# Patient Record
Sex: FEMALE | Race: WHITE | NOT HISPANIC OR LATINO | URBAN - METROPOLITAN AREA
[De-identification: names, ages, dates, MRNs, and addresses within clinical notes are randomized per-mention and may not be internally consistent; named-entity substitution may affect disease eponyms.]

---

## 2018-08-04 ENCOUNTER — EMERGENCY (EMERGENCY)
Facility: HOSPITAL | Age: 28
LOS: 0 days | Discharge: HOME | End: 2018-08-04
Attending: EMERGENCY MEDICINE | Admitting: EMERGENCY MEDICINE

## 2018-08-04 VITALS
HEART RATE: 87 BPM | TEMPERATURE: 98 F | RESPIRATION RATE: 18 BRPM | OXYGEN SATURATION: 97 % | DIASTOLIC BLOOD PRESSURE: 61 MMHG | SYSTOLIC BLOOD PRESSURE: 105 MMHG

## 2018-08-04 DIAGNOSIS — Z88.1 ALLERGY STATUS TO OTHER ANTIBIOTIC AGENTS STATUS: ICD-10-CM

## 2018-08-04 DIAGNOSIS — Z79.899 OTHER LONG TERM (CURRENT) DRUG THERAPY: ICD-10-CM

## 2018-08-04 DIAGNOSIS — L50.0 ALLERGIC URTICARIA: ICD-10-CM

## 2018-08-04 DIAGNOSIS — L50.9 URTICARIA, UNSPECIFIED: ICD-10-CM

## 2018-08-04 DIAGNOSIS — F32.9 MAJOR DEPRESSIVE DISORDER, SINGLE EPISODE, UNSPECIFIED: ICD-10-CM

## 2018-08-04 RX ORDER — DIPHENHYDRAMINE HCL 50 MG
50 CAPSULE ORAL ONCE
Qty: 0 | Refills: 0 | Status: COMPLETED | OUTPATIENT
Start: 2018-08-04 | End: 2018-08-04

## 2018-08-04 RX ORDER — FAMOTIDINE 10 MG/ML
20 INJECTION INTRAVENOUS DAILY
Qty: 0 | Refills: 0 | Status: DISCONTINUED | OUTPATIENT
Start: 2018-08-04 | End: 2018-08-04

## 2018-08-04 RX ADMIN — FAMOTIDINE 20 MILLIGRAM(S): 10 INJECTION INTRAVENOUS at 14:33

## 2018-08-04 RX ADMIN — Medication 50 MILLIGRAM(S): at 14:33

## 2018-08-04 RX ADMIN — Medication 60 MILLIGRAM(S): at 14:33

## 2018-08-04 NOTE — ED ADULT TRIAGE NOTE - CHIEF COMPLAINT QUOTE
pt has hives to her right flank, pt has swelling to her right hand, and is unable to move fingers on her right hand. pt had some diff breathing yesterday but has gotten better.

## 2018-08-04 NOTE — ED ADULT NURSE NOTE - OBJECTIVE STATEMENT
Pt complains of hives, right hand swelling and swollen lip. Pt remains free from respiratory distress.

## 2018-08-04 NOTE — ED ADULT NURSE NOTE - NSIMPLEMENTINTERV_GEN_ALL_ED
Implemented All Universal Safety Interventions:  [place] to call system. Call bell, personal items and telephone within reach. Instruct patient to call for assistance. Room bathroom lighting operational. Non-slip footwear when patient is off stretcher. Physically safe environment: no spills, clutter or unnecessary equipment. Stretcher in lowest position, wheels locked, appropriate side rails in place.

## 2018-08-04 NOTE — ED PROVIDER NOTE - ATTENDING CONTRIBUTION TO CARE
Seen with PA agree with above, scattered hives, no swelling to oral mucosa, lungs- clear. Patient better after Benadryl and Prednisone, will discharge to fallow up with PMD on prednisone 40mg daily times 5- days

## 2018-08-04 NOTE — ED PROVIDER NOTE - PROGRESS NOTE DETAILS
Well appearing, non-toxic appearance with no evidence to suggest a more serious acute issue other than findings.  no indication for epi based on exam . The treatment plan and instructions for after care were reviewed prior to office discharge, pt verbalized understanding of plan of care, proper use of prescription /OTC meds (prednisone, pepcid and benadryl) and reasons to return to office or seek additional medical evaluation for ongoing or worsening complaints  side effects of steroids discussed. risk for GI upset. PUD, gerd, gastrities, bleeding explained.  take steroid w food, may use otc prilosec if GI upset.  d/c use if intolerable s/e , ie pain, psychosis  pt improved from meds in ER Pt aware that we will have official radiology read pending, and may result in change of xray read.  Pt voices understanding of use of medications, instructions for care, and reasons to return or to go to ED

## 2022-01-01 ENCOUNTER — INPATIENT (INPATIENT)
Facility: HOSPITAL | Age: 32
LOS: 3 days | End: 2022-05-19
Attending: INTERNAL MEDICINE
Payer: SUBSIDIZED

## 2022-01-01 VITALS
RESPIRATION RATE: 20 BRPM | HEART RATE: 102 BPM | SYSTOLIC BLOOD PRESSURE: 99 MMHG | DIASTOLIC BLOOD PRESSURE: 55 MMHG | OXYGEN SATURATION: 99 % | TEMPERATURE: 96 F

## 2022-01-01 LAB
ALBUMIN SERPL ELPH-MCNC: 2.7 G/DL — LOW (ref 3.5–5.2)
ALBUMIN SERPL ELPH-MCNC: 3 G/DL — LOW (ref 3.5–5.2)
ALBUMIN SERPL ELPH-MCNC: 3.4 G/DL — LOW (ref 3.5–5.2)
ALBUMIN SERPL ELPH-MCNC: 3.6 G/DL — SIGNIFICANT CHANGE UP (ref 3.5–5.2)
ALP SERPL-CCNC: 104 U/L — SIGNIFICANT CHANGE UP (ref 30–115)
ALP SERPL-CCNC: 65 U/L — SIGNIFICANT CHANGE UP (ref 30–115)
ALP SERPL-CCNC: 71 U/L — SIGNIFICANT CHANGE UP (ref 30–115)
ALP SERPL-CCNC: 81 U/L — SIGNIFICANT CHANGE UP (ref 30–115)
ALT FLD-CCNC: 104 U/L — HIGH (ref 0–41)
ALT FLD-CCNC: 47 U/L — HIGH (ref 0–41)
ALT FLD-CCNC: 62 U/L — HIGH (ref 0–41)
ALT FLD-CCNC: 79 U/L — HIGH (ref 0–41)
AMPHET UR-MCNC: NEGATIVE — SIGNIFICANT CHANGE UP
ANION GAP SERPL CALC-SCNC: 10 MMOL/L — SIGNIFICANT CHANGE UP (ref 7–14)
ANION GAP SERPL CALC-SCNC: 11 MMOL/L — SIGNIFICANT CHANGE UP (ref 7–14)
ANION GAP SERPL CALC-SCNC: 12 MMOL/L — SIGNIFICANT CHANGE UP (ref 7–14)
ANION GAP SERPL CALC-SCNC: 13 MMOL/L — SIGNIFICANT CHANGE UP (ref 7–14)
ANION GAP SERPL CALC-SCNC: 14 MMOL/L — SIGNIFICANT CHANGE UP (ref 7–14)
ANION GAP SERPL CALC-SCNC: 24 MMOL/L — HIGH (ref 7–14)
ANION GAP SERPL CALC-SCNC: 8 MMOL/L — SIGNIFICANT CHANGE UP (ref 7–14)
ANION GAP SERPL CALC-SCNC: 9 MMOL/L — SIGNIFICANT CHANGE UP (ref 7–14)
ANISOCYTOSIS BLD QL: SLIGHT — SIGNIFICANT CHANGE UP
ANISOCYTOSIS BLD QL: SLIGHT — SIGNIFICANT CHANGE UP
APPEARANCE UR: ABNORMAL
APPEARANCE UR: CLEAR — SIGNIFICANT CHANGE UP
APPEARANCE UR: CLEAR — SIGNIFICANT CHANGE UP
APTT BLD: 30.8 SEC — SIGNIFICANT CHANGE UP (ref 27–39.2)
AST SERPL-CCNC: 115 U/L — HIGH (ref 0–41)
AST SERPL-CCNC: 34 U/L — SIGNIFICANT CHANGE UP (ref 0–41)
AST SERPL-CCNC: 61 U/L — HIGH (ref 0–41)
AST SERPL-CCNC: 76 U/L — HIGH (ref 0–41)
BACTERIA # UR AUTO: NEGATIVE — SIGNIFICANT CHANGE UP
BARBITURATES UR SCN-MCNC: NEGATIVE — SIGNIFICANT CHANGE UP
BASE EXCESS BLDA CALC-SCNC: -2.2 MMOL/L — LOW (ref -2–3)
BASE EXCESS BLDA CALC-SCNC: -3.8 MMOL/L — LOW (ref -2–3)
BASE EXCESS BLDA CALC-SCNC: -8.8 MMOL/L — LOW (ref -2–3)
BASE EXCESS BLDV CALC-SCNC: -17.9 MMOL/L — LOW (ref -2–3)
BASOPHILS # BLD AUTO: 0 K/UL — SIGNIFICANT CHANGE UP (ref 0–0.2)
BASOPHILS # BLD AUTO: 0 K/UL — SIGNIFICANT CHANGE UP (ref 0–0.2)
BASOPHILS # BLD AUTO: 0.06 K/UL — SIGNIFICANT CHANGE UP (ref 0–0.2)
BASOPHILS # BLD AUTO: 0.06 K/UL — SIGNIFICANT CHANGE UP (ref 0–0.2)
BASOPHILS NFR BLD AUTO: 0 % — SIGNIFICANT CHANGE UP (ref 0–1)
BASOPHILS NFR BLD AUTO: 0 % — SIGNIFICANT CHANGE UP (ref 0–1)
BASOPHILS NFR BLD AUTO: 0.2 % — SIGNIFICANT CHANGE UP (ref 0–1)
BASOPHILS NFR BLD AUTO: 0.3 % — SIGNIFICANT CHANGE UP (ref 0–1)
BENZODIAZ UR-MCNC: POSITIVE
BENZOYLEGONINE, UR RESULT: 4666 NG/ML — SIGNIFICANT CHANGE UP
BILIRUB SERPL-MCNC: 0.2 MG/DL — SIGNIFICANT CHANGE UP (ref 0.2–1.2)
BILIRUB SERPL-MCNC: 0.2 MG/DL — SIGNIFICANT CHANGE UP (ref 0.2–1.2)
BILIRUB SERPL-MCNC: 0.3 MG/DL — SIGNIFICANT CHANGE UP (ref 0.2–1.2)
BILIRUB SERPL-MCNC: 0.5 MG/DL — SIGNIFICANT CHANGE UP (ref 0.2–1.2)
BILIRUB UR-MCNC: NEGATIVE — SIGNIFICANT CHANGE UP
BLD GP AB SCN SERPL QL: SIGNIFICANT CHANGE UP
BUN SERPL-MCNC: 10 MG/DL — SIGNIFICANT CHANGE UP (ref 10–20)
BUN SERPL-MCNC: 11 MG/DL — SIGNIFICANT CHANGE UP (ref 10–20)
BUN SERPL-MCNC: 12 MG/DL — SIGNIFICANT CHANGE UP (ref 10–20)
BUN SERPL-MCNC: 13 MG/DL — SIGNIFICANT CHANGE UP (ref 10–20)
BUN SERPL-MCNC: 14 MG/DL — SIGNIFICANT CHANGE UP (ref 10–20)
BUN SERPL-MCNC: 15 MG/DL — SIGNIFICANT CHANGE UP (ref 10–20)
BUN SERPL-MCNC: 17 MG/DL — SIGNIFICANT CHANGE UP (ref 10–20)
BUN SERPL-MCNC: 18 MG/DL — SIGNIFICANT CHANGE UP (ref 10–20)
BUN SERPL-MCNC: 19 MG/DL — SIGNIFICANT CHANGE UP (ref 10–20)
BUN SERPL-MCNC: 19 MG/DL — SIGNIFICANT CHANGE UP (ref 10–20)
BUN SERPL-MCNC: 21 MG/DL — HIGH (ref 10–20)
BUN SERPL-MCNC: 9 MG/DL — LOW (ref 10–20)
BZE UR QL SCN: 4666 NG/ML — SIGNIFICANT CHANGE UP
CA-I SERPL-SCNC: 1.51 MMOL/L — HIGH (ref 1.15–1.33)
CALCIUM SERPL-MCNC: 10 MG/DL — SIGNIFICANT CHANGE UP (ref 8.5–10.1)
CALCIUM SERPL-MCNC: 10.2 MG/DL — HIGH (ref 8.5–10.1)
CALCIUM SERPL-MCNC: 10.3 MG/DL — HIGH (ref 8.5–10.1)
CALCIUM SERPL-MCNC: 10.5 MG/DL — HIGH (ref 8.5–10.1)
CALCIUM SERPL-MCNC: 10.8 MG/DL — HIGH (ref 8.5–10.1)
CALCIUM SERPL-MCNC: 7.4 MG/DL — LOW (ref 8.5–10.1)
CALCIUM SERPL-MCNC: 7.8 MG/DL — LOW (ref 8.5–10.1)
CALCIUM SERPL-MCNC: 7.8 MG/DL — LOW (ref 8.5–10.1)
CALCIUM SERPL-MCNC: 7.9 MG/DL — LOW (ref 8.5–10.1)
CALCIUM SERPL-MCNC: 8.2 MG/DL — LOW (ref 8.5–10.1)
CALCIUM SERPL-MCNC: 8.4 MG/DL — LOW (ref 8.5–10.1)
CALCIUM SERPL-MCNC: 8.4 MG/DL — LOW (ref 8.5–10.1)
CALCIUM SERPL-MCNC: 8.5 MG/DL — SIGNIFICANT CHANGE UP (ref 8.5–10.1)
CALCIUM SERPL-MCNC: 9 MG/DL — SIGNIFICANT CHANGE UP (ref 8.5–10.1)
CALCIUM SERPL-MCNC: 9.3 MG/DL — SIGNIFICANT CHANGE UP (ref 8.5–10.1)
CALCIUM SERPL-MCNC: 9.6 MG/DL — SIGNIFICANT CHANGE UP (ref 8.5–10.1)
CALCIUM SERPL-MCNC: 9.6 MG/DL — SIGNIFICANT CHANGE UP (ref 8.5–10.1)
CALCIUM SERPL-MCNC: 9.8 MG/DL — SIGNIFICANT CHANGE UP (ref 8.5–10.1)
CALCIUM UR-MCNC: 41 MG/DL — SIGNIFICANT CHANGE UP
CHLORIDE SERPL-SCNC: 100 MMOL/L — SIGNIFICANT CHANGE UP (ref 98–110)
CHLORIDE SERPL-SCNC: 119 MMOL/L — HIGH (ref 98–110)
CHLORIDE SERPL-SCNC: 120 MMOL/L — HIGH (ref 98–110)
CHLORIDE SERPL-SCNC: 121 MMOL/L — HIGH (ref 98–110)
CHLORIDE SERPL-SCNC: 128 MMOL/L — HIGH (ref 98–110)
CHLORIDE SERPL-SCNC: 129 MMOL/L — HIGH (ref 98–110)
CHLORIDE SERPL-SCNC: 130 MMOL/L — HIGH (ref 98–110)
CHLORIDE SERPL-SCNC: 131 MMOL/L — HIGH (ref 98–110)
CHLORIDE SERPL-SCNC: 135 MMOL/L — HIGH (ref 98–110)
CHLORIDE SERPL-SCNC: 139 MMOL/L — HIGH (ref 98–110)
CHLORIDE SERPL-SCNC: 140 MMOL/L — HIGH (ref 98–110)
CHLORIDE SERPL-SCNC: 141 MMOL/L — HIGH (ref 98–110)
CHLORIDE SERPL-SCNC: 145 MMOL/L — HIGH (ref 98–110)
CHLORIDE SERPL-SCNC: 147 MMOL/L — HIGH (ref 98–110)
CHLORIDE SERPL-SCNC: 147 MMOL/L — HIGH (ref 98–110)
CHLORIDE SERPL-SCNC: 149 MMOL/L — HIGH (ref 98–110)
CK MB CFR SERPL CALC: 8.9 NG/ML — HIGH (ref 0.6–6.3)
CO2 SERPL-SCNC: 12 MMOL/L — LOW (ref 17–32)
CO2 SERPL-SCNC: 15 MMOL/L — LOW (ref 17–32)
CO2 SERPL-SCNC: 15 MMOL/L — LOW (ref 17–32)
CO2 SERPL-SCNC: 16 MMOL/L — LOW (ref 17–32)
CO2 SERPL-SCNC: 16 MMOL/L — LOW (ref 17–32)
CO2 SERPL-SCNC: 17 MMOL/L — SIGNIFICANT CHANGE UP (ref 17–32)
CO2 SERPL-SCNC: 18 MMOL/L — SIGNIFICANT CHANGE UP (ref 17–32)
CO2 SERPL-SCNC: 19 MMOL/L — SIGNIFICANT CHANGE UP (ref 17–32)
CO2 SERPL-SCNC: 20 MMOL/L — SIGNIFICANT CHANGE UP (ref 17–32)
COCAINE IN-HOUSE INTERPRETATION: POSITIVE
COCAINE METAB.OTHER UR-MCNC: POSITIVE
COCAINE UR QL SCN: POSITIVE
COLOR SPEC: COLORLESS — SIGNIFICANT CHANGE UP
COLOR SPEC: COLORLESS — SIGNIFICANT CHANGE UP
COLOR SPEC: YELLOW — SIGNIFICANT CHANGE UP
CREAT ?TM UR-MCNC: 118 MG/DL — SIGNIFICANT CHANGE UP
CREAT SERPL-MCNC: 0.6 MG/DL — LOW (ref 0.7–1.5)
CREAT SERPL-MCNC: 0.7 MG/DL — SIGNIFICANT CHANGE UP (ref 0.7–1.5)
CREAT SERPL-MCNC: 0.8 MG/DL — SIGNIFICANT CHANGE UP (ref 0.7–1.5)
CREAT SERPL-MCNC: 0.9 MG/DL — SIGNIFICANT CHANGE UP (ref 0.7–1.5)
CREAT SERPL-MCNC: 1.1 MG/DL — SIGNIFICANT CHANGE UP (ref 0.7–1.5)
CREAT SERPL-MCNC: 1.1 MG/DL — SIGNIFICANT CHANGE UP (ref 0.7–1.5)
CREAT SERPL-MCNC: 1.2 MG/DL — SIGNIFICANT CHANGE UP (ref 0.7–1.5)
CREAT SERPL-MCNC: 1.2 MG/DL — SIGNIFICANT CHANGE UP (ref 0.7–1.5)
CREAT SERPL-MCNC: 1.3 MG/DL — SIGNIFICANT CHANGE UP (ref 0.7–1.5)
D DIMER BLD IA.RAPID-MCNC: 3277 NG/ML DDU — HIGH (ref 0–230)
DIFF PNL FLD: ABNORMAL
EGFR: 100 ML/MIN/1.73M2 — SIGNIFICANT CHANGE UP
EGFR: 117 ML/MIN/1.73M2 — SIGNIFICANT CHANGE UP
EGFR: 121 ML/MIN/1.73M2 — SIGNIFICANT CHANGE UP
EGFR: 56 ML/MIN/1.73M2 — LOW
EGFR: 61 ML/MIN/1.73M2 — SIGNIFICANT CHANGE UP
EGFR: 61 ML/MIN/1.73M2 — SIGNIFICANT CHANGE UP
EGFR: 68 ML/MIN/1.73M2 — SIGNIFICANT CHANGE UP
EGFR: 68 ML/MIN/1.73M2 — SIGNIFICANT CHANGE UP
EGFR: 87 ML/MIN/1.73M2 — SIGNIFICANT CHANGE UP
EOSINOPHIL # BLD AUTO: 0.03 K/UL — SIGNIFICANT CHANGE UP (ref 0–0.7)
EOSINOPHIL # BLD AUTO: 0.11 K/UL — SIGNIFICANT CHANGE UP (ref 0–0.7)
EOSINOPHIL # BLD AUTO: 0.32 K/UL — SIGNIFICANT CHANGE UP (ref 0–0.7)
EOSINOPHIL # BLD AUTO: 0.55 K/UL — SIGNIFICANT CHANGE UP (ref 0–0.7)
EOSINOPHIL NFR BLD AUTO: 0.1 % — SIGNIFICANT CHANGE UP (ref 0–8)
EOSINOPHIL NFR BLD AUTO: 0.8 % — SIGNIFICANT CHANGE UP (ref 0–8)
EOSINOPHIL NFR BLD AUTO: 1.7 % — SIGNIFICANT CHANGE UP (ref 0–8)
EOSINOPHIL NFR BLD AUTO: 3.5 % — SIGNIFICANT CHANGE UP (ref 0–8)
EPI CELLS # UR: 0 /HPF — SIGNIFICANT CHANGE UP (ref 0–5)
EPI CELLS # UR: 16 /HPF — HIGH (ref 0–5)
EPI CELLS # UR: 2 /HPF — SIGNIFICANT CHANGE UP (ref 0–5)
ETHANOL SERPL-MCNC: <10 MG/DL — SIGNIFICANT CHANGE UP
GAS PNL BLDA: SIGNIFICANT CHANGE UP
GAS PNL BLDA: SIGNIFICANT CHANGE UP
GAS PNL BLDV: 131 MMOL/L — LOW (ref 136–145)
GAS PNL BLDV: SIGNIFICANT CHANGE UP
GIANT PLATELETS BLD QL SMEAR: PRESENT — SIGNIFICANT CHANGE UP
GIANT PLATELETS BLD QL SMEAR: PRESENT — SIGNIFICANT CHANGE UP
GLUCOSE SERPL-MCNC: 100 MG/DL — HIGH (ref 70–99)
GLUCOSE SERPL-MCNC: 106 MG/DL — HIGH (ref 70–99)
GLUCOSE SERPL-MCNC: 106 MG/DL — HIGH (ref 70–99)
GLUCOSE SERPL-MCNC: 121 MG/DL — HIGH (ref 70–99)
GLUCOSE SERPL-MCNC: 177 MG/DL — HIGH (ref 70–99)
GLUCOSE SERPL-MCNC: 184 MG/DL — HIGH (ref 70–99)
GLUCOSE SERPL-MCNC: 190 MG/DL — HIGH (ref 70–99)
GLUCOSE SERPL-MCNC: 190 MG/DL — HIGH (ref 70–99)
GLUCOSE SERPL-MCNC: 199 MG/DL — HIGH (ref 70–99)
GLUCOSE SERPL-MCNC: 226 MG/DL — HIGH (ref 70–99)
GLUCOSE SERPL-MCNC: 227 MG/DL — HIGH (ref 70–99)
GLUCOSE SERPL-MCNC: 227 MG/DL — HIGH (ref 70–99)
GLUCOSE SERPL-MCNC: 232 MG/DL — HIGH (ref 70–99)
GLUCOSE SERPL-MCNC: 239 MG/DL — HIGH (ref 70–99)
GLUCOSE SERPL-MCNC: 241 MG/DL — HIGH (ref 70–99)
GLUCOSE SERPL-MCNC: 245 MG/DL — HIGH (ref 70–99)
GLUCOSE SERPL-MCNC: 254 MG/DL — HIGH (ref 70–99)
GLUCOSE SERPL-MCNC: 266 MG/DL — HIGH (ref 70–99)
GLUCOSE SERPL-MCNC: 276 MG/DL — HIGH (ref 70–99)
GLUCOSE SERPL-MCNC: 437 MG/DL — HIGH (ref 70–99)
GLUCOSE UR QL: ABNORMAL
GLUCOSE UR QL: NEGATIVE — SIGNIFICANT CHANGE UP
GLUCOSE UR QL: NEGATIVE — SIGNIFICANT CHANGE UP
HCG SERPL QL: NEGATIVE — SIGNIFICANT CHANGE UP
HCO3 BLDA-SCNC: 16 MMOL/L — LOW (ref 21–28)
HCO3 BLDA-SCNC: 19 MMOL/L — LOW (ref 21–28)
HCO3 BLDA-SCNC: 22 MMOL/L — SIGNIFICANT CHANGE UP (ref 21–28)
HCO3 BLDV-SCNC: 15 MMOL/L — LOW (ref 22–29)
HCT VFR BLD CALC: 33.8 % — LOW (ref 37–47)
HCT VFR BLD CALC: 35 % — LOW (ref 37–47)
HCT VFR BLD CALC: 38 % — SIGNIFICANT CHANGE UP (ref 37–47)
HCT VFR BLD CALC: 38.6 % — SIGNIFICANT CHANGE UP (ref 37–47)
HCT VFR BLD CALC: 38.7 % — SIGNIFICANT CHANGE UP (ref 37–47)
HCT VFR BLD CALC: 40.3 % — SIGNIFICANT CHANGE UP (ref 37–47)
HCT VFR BLD CALC: 45.3 % — SIGNIFICANT CHANGE UP (ref 37–47)
HCT VFR BLDA CALC: 36 % — LOW (ref 39–51)
HGB BLD CALC-MCNC: 12.1 G/DL — LOW (ref 12.6–17.4)
HGB BLD-MCNC: 11.4 G/DL — LOW (ref 12–16)
HGB BLD-MCNC: 11.9 G/DL — LOW (ref 12–16)
HGB BLD-MCNC: 12.1 G/DL — SIGNIFICANT CHANGE UP (ref 12–16)
HGB BLD-MCNC: 12.5 G/DL — SIGNIFICANT CHANGE UP (ref 12–16)
HGB BLD-MCNC: 13.1 G/DL — SIGNIFICANT CHANGE UP (ref 12–16)
HGB BLD-MCNC: 13.3 G/DL — SIGNIFICANT CHANGE UP (ref 12–16)
HGB BLD-MCNC: 15.1 G/DL — SIGNIFICANT CHANGE UP (ref 12–16)
HOROWITZ INDEX BLDA+IHG-RTO: 30 — SIGNIFICANT CHANGE UP
HOROWITZ INDEX BLDA+IHG-RTO: 30 — SIGNIFICANT CHANGE UP
HOROWITZ INDEX BLDA+IHG-RTO: 50 — SIGNIFICANT CHANGE UP
HYALINE CASTS # UR AUTO: 1 /LPF — SIGNIFICANT CHANGE UP (ref 0–7)
HYALINE CASTS # UR AUTO: 33 /LPF — HIGH (ref 0–7)
HYALINE CASTS # UR AUTO: 4 /LPF — SIGNIFICANT CHANGE UP (ref 0–7)
IMM GRANULOCYTES NFR BLD AUTO: 1 % — HIGH (ref 0.1–0.3)
IMM GRANULOCYTES NFR BLD AUTO: 2.2 % — HIGH (ref 0.1–0.3)
INR BLD: 1.33 RATIO — HIGH (ref 0.65–1.3)
KETONES UR-MCNC: NEGATIVE — SIGNIFICANT CHANGE UP
LACTATE BLDV-MCNC: 11.3 MMOL/L — CRITICAL HIGH (ref 0.5–2)
LACTATE SERPL-SCNC: 2.5 MMOL/L — HIGH (ref 0.7–2)
LEUKOCYTE ESTERASE UR-ACNC: NEGATIVE — SIGNIFICANT CHANGE UP
LYMPHOCYTES # BLD AUTO: 1.38 K/UL — SIGNIFICANT CHANGE UP (ref 1.2–3.4)
LYMPHOCYTES # BLD AUTO: 1.84 K/UL — SIGNIFICANT CHANGE UP (ref 1.2–3.4)
LYMPHOCYTES # BLD AUTO: 17.4 % — LOW (ref 20.5–51.1)
LYMPHOCYTES # BLD AUTO: 2.74 K/UL — SIGNIFICANT CHANGE UP (ref 1.2–3.4)
LYMPHOCYTES # BLD AUTO: 34.8 % — SIGNIFICANT CHANGE UP (ref 20.5–51.1)
LYMPHOCYTES # BLD AUTO: 4.8 K/UL — HIGH (ref 1.2–3.4)
LYMPHOCYTES # BLD AUTO: 5.8 % — LOW (ref 20.5–51.1)
LYMPHOCYTES # BLD AUTO: 7.5 % — LOW (ref 20.5–51.1)
MAGNESIUM SERPL-MCNC: 1.6 MG/DL — LOW (ref 1.8–2.4)
MAGNESIUM SERPL-MCNC: 1.6 MG/DL — LOW (ref 1.8–2.4)
MAGNESIUM SERPL-MCNC: 1.7 MG/DL — LOW (ref 1.8–2.4)
MAGNESIUM SERPL-MCNC: 1.8 MG/DL — SIGNIFICANT CHANGE UP (ref 1.8–2.4)
MAGNESIUM SERPL-MCNC: 1.9 MG/DL — SIGNIFICANT CHANGE UP (ref 1.8–2.4)
MAGNESIUM SERPL-MCNC: 2.1 MG/DL — SIGNIFICANT CHANGE UP (ref 1.8–2.4)
MAGNESIUM SERPL-MCNC: 2.2 MG/DL — SIGNIFICANT CHANGE UP (ref 1.8–2.4)
MAGNESIUM SERPL-MCNC: 2.3 MG/DL — SIGNIFICANT CHANGE UP (ref 1.8–2.4)
MAGNESIUM SERPL-MCNC: 2.6 MG/DL — HIGH (ref 1.8–2.4)
MAGNESIUM SERPL-MCNC: 2.8 MG/DL — HIGH (ref 1.8–2.4)
MANUAL SMEAR VERIFICATION: SIGNIFICANT CHANGE UP
MCHC RBC-ENTMCNC: 28.4 PG — SIGNIFICANT CHANGE UP (ref 27–31)
MCHC RBC-ENTMCNC: 28.7 PG — SIGNIFICANT CHANGE UP (ref 27–31)
MCHC RBC-ENTMCNC: 28.7 PG — SIGNIFICANT CHANGE UP (ref 27–31)
MCHC RBC-ENTMCNC: 28.8 PG — SIGNIFICANT CHANGE UP (ref 27–31)
MCHC RBC-ENTMCNC: 28.9 PG — SIGNIFICANT CHANGE UP (ref 27–31)
MCHC RBC-ENTMCNC: 29 PG — SIGNIFICANT CHANGE UP (ref 27–31)
MCHC RBC-ENTMCNC: 29.1 PG — SIGNIFICANT CHANGE UP (ref 27–31)
MCHC RBC-ENTMCNC: 30.7 G/DL — LOW (ref 32–37)
MCHC RBC-ENTMCNC: 32.9 G/DL — SIGNIFICANT CHANGE UP (ref 32–37)
MCHC RBC-ENTMCNC: 33 G/DL — SIGNIFICANT CHANGE UP (ref 32–37)
MCHC RBC-ENTMCNC: 33.3 G/DL — SIGNIFICANT CHANGE UP (ref 32–37)
MCHC RBC-ENTMCNC: 33.7 G/DL — SIGNIFICANT CHANGE UP (ref 32–37)
MCHC RBC-ENTMCNC: 33.9 G/DL — SIGNIFICANT CHANGE UP (ref 32–37)
MCHC RBC-ENTMCNC: 34.6 G/DL — SIGNIFICANT CHANGE UP (ref 32–37)
MCV RBC AUTO: 84.1 FL — SIGNIFICANT CHANGE UP (ref 81–99)
MCV RBC AUTO: 85 FL — SIGNIFICANT CHANGE UP (ref 81–99)
MCV RBC AUTO: 85.4 FL — SIGNIFICANT CHANGE UP (ref 81–99)
MCV RBC AUTO: 86.1 FL — SIGNIFICANT CHANGE UP (ref 81–99)
MCV RBC AUTO: 86.4 FL — SIGNIFICANT CHANGE UP (ref 81–99)
MCV RBC AUTO: 86.9 FL — SIGNIFICANT CHANGE UP (ref 81–99)
MCV RBC AUTO: 94.2 FL — SIGNIFICANT CHANGE UP (ref 81–99)
METAMYELOCYTES # FLD: 3.5 % — HIGH (ref 0–0)
METHADONE UR-MCNC: NEGATIVE — SIGNIFICANT CHANGE UP
MICROCYTES BLD QL: SLIGHT — SIGNIFICANT CHANGE UP
MICROCYTES BLD QL: SLIGHT — SIGNIFICANT CHANGE UP
MONOCYTES # BLD AUTO: 0.36 K/UL — SIGNIFICANT CHANGE UP (ref 0.1–0.6)
MONOCYTES # BLD AUTO: 0.41 K/UL — SIGNIFICANT CHANGE UP (ref 0.1–0.6)
MONOCYTES # BLD AUTO: 0.85 K/UL — HIGH (ref 0.1–0.6)
MONOCYTES # BLD AUTO: 1.27 K/UL — HIGH (ref 0.1–0.6)
MONOCYTES NFR BLD AUTO: 2.6 % — SIGNIFICANT CHANGE UP (ref 1.7–9.3)
MONOCYTES NFR BLD AUTO: 2.6 % — SIGNIFICANT CHANGE UP (ref 1.7–9.3)
MONOCYTES NFR BLD AUTO: 4 % — SIGNIFICANT CHANGE UP (ref 1.7–9.3)
MONOCYTES NFR BLD AUTO: 4.6 % — SIGNIFICANT CHANGE UP (ref 1.7–9.3)
MYELOCYTES NFR BLD: 0.9 % — HIGH (ref 0–0)
NEUTROPHILS # BLD AUTO: 12.03 K/UL — HIGH (ref 1.4–6.5)
NEUTROPHILS # BLD AUTO: 15.41 K/UL — HIGH (ref 1.4–6.5)
NEUTROPHILS # BLD AUTO: 27.99 K/UL — HIGH (ref 1.4–6.5)
NEUTROPHILS # BLD AUTO: 7.92 K/UL — HIGH (ref 1.4–6.5)
NEUTROPHILS NFR BLD AUTO: 57.4 % — SIGNIFICANT CHANGE UP (ref 42.2–75.2)
NEUTROPHILS NFR BLD AUTO: 73.9 % — SIGNIFICANT CHANGE UP (ref 42.2–75.2)
NEUTROPHILS NFR BLD AUTO: 83.7 % — HIGH (ref 42.2–75.2)
NEUTROPHILS NFR BLD AUTO: 88.9 % — HIGH (ref 42.2–75.2)
NEUTS BAND # BLD: 2.6 % — SIGNIFICANT CHANGE UP (ref 0–6)
NEUTS BAND # BLD: 4.3 % — SIGNIFICANT CHANGE UP (ref 0–6)
NITRITE UR-MCNC: NEGATIVE — SIGNIFICANT CHANGE UP
NRBC # BLD: 0 /100 WBCS — SIGNIFICANT CHANGE UP (ref 0–0)
NSE FLD-MCNC: 24.8 NG/ML — HIGH (ref 0–17.6)
NT-PROBNP SERPL-SCNC: 215 PG/ML — SIGNIFICANT CHANGE UP (ref 0–300)
OPIATES UR-MCNC: NEGATIVE — SIGNIFICANT CHANGE UP
OSMOLALITY SERPL: 326 MOS/KG — HIGH (ref 275–300)
OSMOLALITY SERPL: 334 MOS/KG — HIGH (ref 275–300)
OSMOLALITY SERPL: 336 MOS/KG — HIGH (ref 275–300)
OSMOLALITY SERPL: 347 MOS/KG — HIGH (ref 275–300)
OSMOLALITY SERPL: 354 MOS/KG — HIGH (ref 275–300)
OSMOLALITY UR: 102 MOS/KG — SIGNIFICANT CHANGE UP (ref 50–1200)
OSMOLALITY UR: 732 MOS/KG — SIGNIFICANT CHANGE UP (ref 50–1200)
PCO2 BLDA: 28 MMHG — SIGNIFICANT CHANGE UP (ref 25–48)
PCO2 BLDA: 29 MMHG — SIGNIFICANT CHANGE UP (ref 25–48)
PCO2 BLDA: 37 MMHG — SIGNIFICANT CHANGE UP (ref 25–48)
PCO2 BLDV: 69 MMHG — HIGH (ref 39–42)
PCP SPEC-MCNC: SIGNIFICANT CHANGE UP
PH BLDA: 7.34 — LOW (ref 7.35–7.45)
PH BLDA: 7.39 — SIGNIFICANT CHANGE UP (ref 7.35–7.45)
PH BLDA: 7.44 — SIGNIFICANT CHANGE UP (ref 7.35–7.45)
PH BLDV: 6.94 — LOW (ref 7.32–7.43)
PH UR: 6 — SIGNIFICANT CHANGE UP (ref 5–8)
PH UR: 6.5 — SIGNIFICANT CHANGE UP (ref 5–8)
PH UR: 6.5 — SIGNIFICANT CHANGE UP (ref 5–8)
PHOSPHATE SERPL-MCNC: 1.1 MG/DL — LOW (ref 2.1–4.9)
PHOSPHATE SERPL-MCNC: 3.1 MG/DL — SIGNIFICANT CHANGE UP (ref 2.1–4.9)
PLAT MORPH BLD: NORMAL — SIGNIFICANT CHANGE UP
PLATELET # BLD AUTO: 106 K/UL — LOW (ref 130–400)
PLATELET # BLD AUTO: 157 K/UL — SIGNIFICANT CHANGE UP (ref 130–400)
PLATELET # BLD AUTO: 243 K/UL — SIGNIFICANT CHANGE UP (ref 130–400)
PLATELET # BLD AUTO: 286 K/UL — SIGNIFICANT CHANGE UP (ref 130–400)
PLATELET # BLD AUTO: 53 K/UL — LOW (ref 130–400)
PLATELET # BLD AUTO: 55 K/UL — LOW (ref 130–400)
PLATELET # BLD AUTO: 55 K/UL — LOW (ref 130–400)
PO2 BLDA: 155 MMHG — HIGH (ref 83–108)
PO2 BLDA: 181 MMHG — HIGH (ref 83–108)
PO2 BLDA: 92 MMHG — SIGNIFICANT CHANGE UP (ref 83–108)
PO2 BLDV: 185 MMHG — SIGNIFICANT CHANGE UP
POIKILOCYTOSIS BLD QL AUTO: SLIGHT — SIGNIFICANT CHANGE UP
POLYCHROMASIA BLD QL SMEAR: SLIGHT — SIGNIFICANT CHANGE UP
POTASSIUM BLDV-SCNC: 4.1 MMOL/L — SIGNIFICANT CHANGE UP (ref 3.5–5.1)
POTASSIUM SERPL-MCNC: 2 MMOL/L — CRITICAL LOW (ref 3.5–5)
POTASSIUM SERPL-MCNC: 2.1 MMOL/L — CRITICAL LOW (ref 3.5–5)
POTASSIUM SERPL-MCNC: 2.8 MMOL/L — LOW (ref 3.5–5)
POTASSIUM SERPL-MCNC: 2.9 MMOL/L — LOW (ref 3.5–5)
POTASSIUM SERPL-MCNC: 2.9 MMOL/L — LOW (ref 3.5–5)
POTASSIUM SERPL-MCNC: 3 MMOL/L — LOW (ref 3.5–5)
POTASSIUM SERPL-MCNC: 3 MMOL/L — LOW (ref 3.5–5)
POTASSIUM SERPL-MCNC: 3.1 MMOL/L — LOW (ref 3.5–5)
POTASSIUM SERPL-MCNC: 3.3 MMOL/L — LOW (ref 3.5–5)
POTASSIUM SERPL-MCNC: 3.4 MMOL/L — LOW (ref 3.5–5)
POTASSIUM SERPL-MCNC: 3.5 MMOL/L — SIGNIFICANT CHANGE UP (ref 3.5–5)
POTASSIUM SERPL-MCNC: 3.5 MMOL/L — SIGNIFICANT CHANGE UP (ref 3.5–5)
POTASSIUM SERPL-MCNC: 3.7 MMOL/L — SIGNIFICANT CHANGE UP (ref 3.5–5)
POTASSIUM SERPL-MCNC: 3.9 MMOL/L — SIGNIFICANT CHANGE UP (ref 3.5–5)
POTASSIUM SERPL-MCNC: 3.9 MMOL/L — SIGNIFICANT CHANGE UP (ref 3.5–5)
POTASSIUM SERPL-MCNC: 4 MMOL/L — SIGNIFICANT CHANGE UP (ref 3.5–5)
POTASSIUM SERPL-MCNC: 4 MMOL/L — SIGNIFICANT CHANGE UP (ref 3.5–5)
POTASSIUM SERPL-MCNC: 4.2 MMOL/L — SIGNIFICANT CHANGE UP (ref 3.5–5)
POTASSIUM SERPL-MCNC: 4.2 MMOL/L — SIGNIFICANT CHANGE UP (ref 3.5–5)
POTASSIUM SERPL-MCNC: 4.3 MMOL/L — SIGNIFICANT CHANGE UP (ref 3.5–5)
POTASSIUM SERPL-SCNC: 2 MMOL/L — CRITICAL LOW (ref 3.5–5)
POTASSIUM SERPL-SCNC: 2.1 MMOL/L — CRITICAL LOW (ref 3.5–5)
POTASSIUM SERPL-SCNC: 2.8 MMOL/L — LOW (ref 3.5–5)
POTASSIUM SERPL-SCNC: 2.9 MMOL/L — LOW (ref 3.5–5)
POTASSIUM SERPL-SCNC: 2.9 MMOL/L — LOW (ref 3.5–5)
POTASSIUM SERPL-SCNC: 3 MMOL/L — LOW (ref 3.5–5)
POTASSIUM SERPL-SCNC: 3 MMOL/L — LOW (ref 3.5–5)
POTASSIUM SERPL-SCNC: 3.1 MMOL/L — LOW (ref 3.5–5)
POTASSIUM SERPL-SCNC: 3.3 MMOL/L — LOW (ref 3.5–5)
POTASSIUM SERPL-SCNC: 3.4 MMOL/L — LOW (ref 3.5–5)
POTASSIUM SERPL-SCNC: 3.5 MMOL/L — SIGNIFICANT CHANGE UP (ref 3.5–5)
POTASSIUM SERPL-SCNC: 3.5 MMOL/L — SIGNIFICANT CHANGE UP (ref 3.5–5)
POTASSIUM SERPL-SCNC: 3.7 MMOL/L — SIGNIFICANT CHANGE UP (ref 3.5–5)
POTASSIUM SERPL-SCNC: 3.9 MMOL/L — SIGNIFICANT CHANGE UP (ref 3.5–5)
POTASSIUM SERPL-SCNC: 3.9 MMOL/L — SIGNIFICANT CHANGE UP (ref 3.5–5)
POTASSIUM SERPL-SCNC: 4 MMOL/L — SIGNIFICANT CHANGE UP (ref 3.5–5)
POTASSIUM SERPL-SCNC: 4 MMOL/L — SIGNIFICANT CHANGE UP (ref 3.5–5)
POTASSIUM SERPL-SCNC: 4.2 MMOL/L — SIGNIFICANT CHANGE UP (ref 3.5–5)
POTASSIUM SERPL-SCNC: 4.2 MMOL/L — SIGNIFICANT CHANGE UP (ref 3.5–5)
POTASSIUM SERPL-SCNC: 4.3 MMOL/L — SIGNIFICANT CHANGE UP (ref 3.5–5)
POTASSIUM UR-SCNC: 34 MMOL/L — SIGNIFICANT CHANGE UP
PROPOXYPHENE QUALITATIVE URINE RESULT: NEGATIVE — SIGNIFICANT CHANGE UP
PROT SERPL-MCNC: 4.9 G/DL — LOW (ref 6–8)
PROT SERPL-MCNC: 5.5 G/DL — LOW (ref 6–8)
PROT SERPL-MCNC: 5.8 G/DL — LOW (ref 6–8)
PROT SERPL-MCNC: 6.2 G/DL — SIGNIFICANT CHANGE UP (ref 6–8)
PROT UR-MCNC: ABNORMAL
PROT UR-MCNC: SIGNIFICANT CHANGE UP
PROT UR-MCNC: SIGNIFICANT CHANGE UP
PROTHROM AB SERPL-ACNC: 15.2 SEC — HIGH (ref 9.95–12.87)
RBC # BLD: 3.96 M/UL — LOW (ref 4.2–5.4)
RBC # BLD: 4.11 M/UL — LOW (ref 4.2–5.4)
RBC # BLD: 4.16 M/UL — LOW (ref 4.2–5.4)
RBC # BLD: 4.4 M/UL — SIGNIFICANT CHANGE UP (ref 4.2–5.4)
RBC # BLD: 4.54 M/UL — SIGNIFICANT CHANGE UP (ref 4.2–5.4)
RBC # BLD: 4.64 M/UL — SIGNIFICANT CHANGE UP (ref 4.2–5.4)
RBC # BLD: 5.26 M/UL — SIGNIFICANT CHANGE UP (ref 4.2–5.4)
RBC # FLD: 13.6 % — SIGNIFICANT CHANGE UP (ref 11.5–14.5)
RBC # FLD: 14 % — SIGNIFICANT CHANGE UP (ref 11.5–14.5)
RBC # FLD: 14.6 % — HIGH (ref 11.5–14.5)
RBC # FLD: 14.9 % — HIGH (ref 11.5–14.5)
RBC # FLD: 15 % — HIGH (ref 11.5–14.5)
RBC # FLD: 15.1 % — HIGH (ref 11.5–14.5)
RBC # FLD: 15.1 % — HIGH (ref 11.5–14.5)
RBC BLD AUTO: ABNORMAL
RBC BLD AUTO: NORMAL — SIGNIFICANT CHANGE UP
RBC BLD AUTO: NORMAL — SIGNIFICANT CHANGE UP
RBC CASTS # UR COMP ASSIST: 1 /HPF — SIGNIFICANT CHANGE UP (ref 0–4)
RBC CASTS # UR COMP ASSIST: 2 /HPF — SIGNIFICANT CHANGE UP (ref 0–4)
RBC CASTS # UR COMP ASSIST: 5 /HPF — HIGH (ref 0–4)
SAO2 % BLDA: 98.5 % — HIGH (ref 94–98)
SAO2 % BLDA: 99.2 % — HIGH (ref 94–98)
SAO2 % BLDA: 99.4 % — HIGH (ref 94–98)
SAO2 % BLDV: 99.6 % — SIGNIFICANT CHANGE UP
SARS-COV-2 RNA SPEC QL NAA+PROBE: SIGNIFICANT CHANGE UP
SODIUM SERPL-SCNC: 136 MMOL/L — SIGNIFICANT CHANGE UP (ref 135–146)
SODIUM SERPL-SCNC: 147 MMOL/L — HIGH (ref 135–146)
SODIUM SERPL-SCNC: 148 MMOL/L — HIGH (ref 135–146)
SODIUM SERPL-SCNC: 148 MMOL/L — HIGH (ref 135–146)
SODIUM SERPL-SCNC: 149 MMOL/L — HIGH (ref 135–146)
SODIUM SERPL-SCNC: 150 MMOL/L — HIGH (ref 135–146)
SODIUM SERPL-SCNC: 154 MMOL/L — HIGH (ref 135–146)
SODIUM SERPL-SCNC: 156 MMOL/L — HIGH (ref 135–146)
SODIUM SERPL-SCNC: 156 MMOL/L — HIGH (ref 135–146)
SODIUM SERPL-SCNC: 160 MMOL/L — HIGH (ref 135–146)
SODIUM SERPL-SCNC: 165 MMOL/L — CRITICAL HIGH (ref 135–146)
SODIUM SERPL-SCNC: 166 MMOL/L — CRITICAL HIGH (ref 135–146)
SODIUM SERPL-SCNC: 168 MMOL/L — CRITICAL HIGH (ref 135–146)
SODIUM SERPL-SCNC: 169 MMOL/L — CRITICAL HIGH (ref 135–146)
SODIUM SERPL-SCNC: 172 MMOL/L — CRITICAL HIGH (ref 135–146)
SODIUM SERPL-SCNC: 173 MMOL/L — CRITICAL HIGH (ref 135–146)
SODIUM SERPL-SCNC: 174 MMOL/L — CRITICAL HIGH (ref 135–146)
SODIUM SERPL-SCNC: 177 MMOL/L — CRITICAL HIGH (ref 135–146)
SODIUM UR-SCNC: 125 MMOL/L — SIGNIFICANT CHANGE UP
SODIUM UR-SCNC: 164 MMOL/L — SIGNIFICANT CHANGE UP
SODIUM UR-SCNC: 20 MMOL/L — SIGNIFICANT CHANGE UP
SP GR SPEC: 1 — LOW (ref 1.01–1.03)
SP GR SPEC: 1 — LOW (ref 1.01–1.03)
SP GR SPEC: 1.02 — SIGNIFICANT CHANGE UP (ref 1.01–1.03)
TROPONIN T SERPL-MCNC: 0.02 NG/ML — HIGH
TROPONIN T SERPL-MCNC: <0.01 NG/ML — SIGNIFICANT CHANGE UP
TROPONIN T SERPL-MCNC: <0.01 NG/ML — SIGNIFICANT CHANGE UP
UROBILINOGEN FLD QL: SIGNIFICANT CHANGE UP
WBC # BLD: 13.79 K/UL — HIGH (ref 4.8–10.8)
WBC # BLD: 15.73 K/UL — HIGH (ref 4.8–10.8)
WBC # BLD: 16.38 K/UL — HIGH (ref 4.8–10.8)
WBC # BLD: 17.26 K/UL — HIGH (ref 4.8–10.8)
WBC # BLD: 18.42 K/UL — HIGH (ref 4.8–10.8)
WBC # BLD: 21.22 K/UL — HIGH (ref 4.8–10.8)
WBC # BLD: 31.49 K/UL — HIGH (ref 4.8–10.8)
WBC # FLD AUTO: 13.79 K/UL — HIGH (ref 4.8–10.8)
WBC # FLD AUTO: 15.73 K/UL — HIGH (ref 4.8–10.8)
WBC # FLD AUTO: 16.38 K/UL — HIGH (ref 4.8–10.8)
WBC # FLD AUTO: 17.26 K/UL — HIGH (ref 4.8–10.8)
WBC # FLD AUTO: 18.42 K/UL — HIGH (ref 4.8–10.8)
WBC # FLD AUTO: 21.22 K/UL — HIGH (ref 4.8–10.8)
WBC # FLD AUTO: 31.49 K/UL — HIGH (ref 4.8–10.8)
WBC UR QL: 1 /HPF — SIGNIFICANT CHANGE UP (ref 0–5)
WBC UR QL: 22 /HPF — HIGH (ref 0–5)
WBC UR QL: 3 /HPF — SIGNIFICANT CHANGE UP (ref 0–5)

## 2022-01-01 PROCEDURE — 95720 EEG PHY/QHP EA INCR W/VEEG: CPT

## 2022-01-01 PROCEDURE — 71045 X-RAY EXAM CHEST 1 VIEW: CPT | Mod: 26,76

## 2022-01-01 PROCEDURE — 71045 X-RAY EXAM CHEST 1 VIEW: CPT | Mod: 26

## 2022-01-01 PROCEDURE — 99291 CRITICAL CARE FIRST HOUR: CPT | Mod: 25

## 2022-01-01 PROCEDURE — 93306 TTE W/DOPPLER COMPLETE: CPT | Mod: 26

## 2022-01-01 PROCEDURE — 93010 ELECTROCARDIOGRAM REPORT: CPT

## 2022-01-01 PROCEDURE — 71045 X-RAY EXAM CHEST 1 VIEW: CPT | Mod: 26,77

## 2022-01-01 PROCEDURE — 36556 INSERT NON-TUNNEL CV CATH: CPT

## 2022-01-01 PROCEDURE — 70450 CT HEAD/BRAIN W/O DYE: CPT | Mod: 26

## 2022-01-01 PROCEDURE — 99291 CRITICAL CARE FIRST HOUR: CPT

## 2022-01-01 PROCEDURE — 99292 CRITICAL CARE ADDL 30 MIN: CPT

## 2022-01-01 PROCEDURE — 71045 X-RAY EXAM CHEST 1 VIEW: CPT | Mod: 26,77,76

## 2022-01-01 PROCEDURE — 99223 1ST HOSP IP/OBS HIGH 75: CPT

## 2022-01-01 RX ORDER — MIDAZOLAM HYDROCHLORIDE 1 MG/ML
0.02 INJECTION, SOLUTION INTRAMUSCULAR; INTRAVENOUS
Qty: 100 | Refills: 0 | Status: DISCONTINUED | OUTPATIENT
Start: 2022-01-01 | End: 2022-01-01

## 2022-01-01 RX ORDER — PIPERACILLIN AND TAZOBACTAM 4; .5 G/20ML; G/20ML
3.38 INJECTION, POWDER, LYOPHILIZED, FOR SOLUTION INTRAVENOUS ONCE
Refills: 0 | Status: COMPLETED | OUTPATIENT
Start: 2022-01-01 | End: 2022-01-01

## 2022-01-01 RX ORDER — CALCIUM GLUCONATE 100 MG/ML
1 VIAL (ML) INTRAVENOUS ONCE
Refills: 0 | Status: DISCONTINUED | OUTPATIENT
Start: 2022-01-01 | End: 2022-01-01

## 2022-01-01 RX ORDER — POTASSIUM CHLORIDE 20 MEQ
20 PACKET (EA) ORAL
Refills: 0 | Status: DISCONTINUED | OUTPATIENT
Start: 2022-01-01 | End: 2022-01-01

## 2022-01-01 RX ORDER — ROCURONIUM BROMIDE 10 MG/ML
100 VIAL (ML) INTRAVENOUS ONCE
Refills: 0 | Status: COMPLETED | OUTPATIENT
Start: 2022-01-01 | End: 2022-01-01

## 2022-01-01 RX ORDER — MANNITOL
75 POWDER (GRAM) MISCELLANEOUS ONCE
Refills: 0 | Status: COMPLETED | OUTPATIENT
Start: 2022-01-01 | End: 2022-01-01

## 2022-01-01 RX ORDER — SODIUM CHLORIDE 5 G/100ML
500 INJECTION, SOLUTION INTRAVENOUS
Refills: 0 | Status: DISCONTINUED | OUTPATIENT
Start: 2022-01-01 | End: 2022-01-01

## 2022-01-01 RX ORDER — AMPICILLIN SODIUM AND SULBACTAM SODIUM 250; 125 MG/ML; MG/ML
1.5 INJECTION, POWDER, FOR SUSPENSION INTRAMUSCULAR; INTRAVENOUS EVERY 6 HOURS
Refills: 0 | Status: DISCONTINUED | OUTPATIENT
Start: 2022-01-01 | End: 2022-01-01

## 2022-01-01 RX ORDER — POTASSIUM CHLORIDE 20 MEQ
20 PACKET (EA) ORAL
Refills: 0 | Status: COMPLETED | OUTPATIENT
Start: 2022-01-01 | End: 2022-01-01

## 2022-01-01 RX ORDER — POTASSIUM PHOSPHATE, MONOBASIC POTASSIUM PHOSPHATE, DIBASIC 236; 224 MG/ML; MG/ML
30 INJECTION, SOLUTION INTRAVENOUS ONCE
Refills: 0 | Status: COMPLETED | OUTPATIENT
Start: 2022-01-01 | End: 2022-01-01

## 2022-01-01 RX ORDER — PANTOPRAZOLE SODIUM 20 MG/1
40 TABLET, DELAYED RELEASE ORAL DAILY
Refills: 0 | Status: DISCONTINUED | OUTPATIENT
Start: 2022-01-01 | End: 2022-01-01

## 2022-01-01 RX ORDER — SODIUM BICARBONATE 1 MEQ/ML
0.19 SYRINGE (ML) INTRAVENOUS
Qty: 150 | Refills: 0 | Status: DISCONTINUED | OUTPATIENT
Start: 2022-01-01 | End: 2022-01-01

## 2022-01-01 RX ORDER — FENTANYL CITRATE 50 UG/ML
0.5 INJECTION INTRAVENOUS
Qty: 5000 | Refills: 0 | Status: DISCONTINUED | OUTPATIENT
Start: 2022-01-01 | End: 2022-01-01

## 2022-01-01 RX ORDER — DEXAMETHASONE 0.5 MG/5ML
8 ELIXIR ORAL ONCE
Refills: 0 | Status: COMPLETED | OUTPATIENT
Start: 2022-01-01 | End: 2022-01-01

## 2022-01-01 RX ORDER — ALBUTEROL 90 UG/1
2 AEROSOL, METERED ORAL EVERY 6 HOURS
Refills: 0 | Status: DISCONTINUED | OUTPATIENT
Start: 2022-01-01 | End: 2022-01-01

## 2022-01-01 RX ORDER — SODIUM CHLORIDE 9 MG/ML
1000 INJECTION, SOLUTION INTRAVENOUS
Refills: 0 | Status: DISCONTINUED | OUTPATIENT
Start: 2022-01-01 | End: 2022-01-01

## 2022-01-01 RX ORDER — CHLORHEXIDINE GLUCONATE 213 G/1000ML
1 SOLUTION TOPICAL
Refills: 0 | Status: DISCONTINUED | OUTPATIENT
Start: 2022-01-01 | End: 2022-01-01

## 2022-01-01 RX ORDER — POTASSIUM CHLORIDE 20 MEQ
20 PACKET (EA) ORAL ONCE
Refills: 0 | Status: COMPLETED | OUTPATIENT
Start: 2022-01-01 | End: 2022-01-01

## 2022-01-01 RX ORDER — MANNITOL
75 POWDER (GRAM) MISCELLANEOUS ONCE
Refills: 0 | Status: DISCONTINUED | OUTPATIENT
Start: 2022-01-01 | End: 2022-01-01

## 2022-01-01 RX ORDER — AMPICILLIN SODIUM AND SULBACTAM SODIUM 250; 125 MG/ML; MG/ML
1.5 INJECTION, POWDER, FOR SUSPENSION INTRAMUSCULAR; INTRAVENOUS ONCE
Refills: 0 | Status: COMPLETED | OUTPATIENT
Start: 2022-01-01 | End: 2022-01-01

## 2022-01-01 RX ORDER — DESMOPRESSIN ACETATE 0.1 MG/1
0.25 TABLET ORAL ONCE
Refills: 0 | Status: DISCONTINUED | OUTPATIENT
Start: 2022-01-01 | End: 2022-01-01

## 2022-01-01 RX ORDER — DESMOPRESSIN ACETATE 0.1 MG/1
0.5 TABLET ORAL ONCE
Refills: 0 | Status: COMPLETED | OUTPATIENT
Start: 2022-01-01 | End: 2022-01-01

## 2022-01-01 RX ORDER — POTASSIUM PHOSPHATE, MONOBASIC POTASSIUM PHOSPHATE, DIBASIC 236; 224 MG/ML; MG/ML
30 INJECTION, SOLUTION INTRAVENOUS ONCE
Refills: 0 | Status: DISCONTINUED | OUTPATIENT
Start: 2022-01-01 | End: 2022-01-01

## 2022-01-01 RX ORDER — DEXMEDETOMIDINE HYDROCHLORIDE IN 0.9% SODIUM CHLORIDE 4 UG/ML
0.05 INJECTION INTRAVENOUS
Qty: 400 | Refills: 0 | Status: DISCONTINUED | OUTPATIENT
Start: 2022-01-01 | End: 2022-01-01

## 2022-01-01 RX ORDER — LEVOTHYROXINE SODIUM 125 MCG
10 TABLET ORAL
Qty: 200 | Refills: 0 | Status: DISCONTINUED | OUTPATIENT
Start: 2022-01-01 | End: 2022-05-20

## 2022-01-01 RX ORDER — NOREPINEPHRINE BITARTRATE/D5W 8 MG/250ML
0.05 PLASTIC BAG, INJECTION (ML) INTRAVENOUS
Qty: 8 | Refills: 0 | Status: DISCONTINUED | OUTPATIENT
Start: 2022-01-01 | End: 2022-01-01

## 2022-01-01 RX ORDER — DESMOPRESSIN ACETATE 0.1 MG/1
0.5 TABLET ORAL EVERY 12 HOURS
Refills: 0 | Status: DISCONTINUED | OUTPATIENT
Start: 2022-01-01 | End: 2022-01-01

## 2022-01-01 RX ORDER — VASOPRESSIN 20 [USP'U]/ML
0.04 INJECTION INTRAVENOUS
Qty: 50 | Refills: 0 | Status: DISCONTINUED | OUTPATIENT
Start: 2022-01-01 | End: 2022-01-01

## 2022-01-01 RX ORDER — MAGNESIUM SULFATE 500 MG/ML
2 VIAL (ML) INJECTION ONCE
Refills: 0 | Status: COMPLETED | OUTPATIENT
Start: 2022-01-01 | End: 2022-01-01

## 2022-01-01 RX ORDER — METOPROLOL TARTRATE 50 MG
5 TABLET ORAL ONCE
Refills: 0 | Status: COMPLETED | OUTPATIENT
Start: 2022-01-01 | End: 2022-01-01

## 2022-01-01 RX ORDER — ETOMIDATE 2 MG/ML
20 INJECTION INTRAVENOUS ONCE
Refills: 0 | Status: COMPLETED | OUTPATIENT
Start: 2022-01-01 | End: 2022-01-01

## 2022-01-01 RX ORDER — CHLORHEXIDINE GLUCONATE 213 G/1000ML
15 SOLUTION TOPICAL EVERY 12 HOURS
Refills: 0 | Status: DISCONTINUED | OUTPATIENT
Start: 2022-01-01 | End: 2022-01-01

## 2022-01-01 RX ORDER — SODIUM CHLORIDE 9 MG/ML
1000 INJECTION INTRAMUSCULAR; INTRAVENOUS; SUBCUTANEOUS ONCE
Refills: 0 | Status: COMPLETED | OUTPATIENT
Start: 2022-01-01 | End: 2022-01-01

## 2022-01-01 RX ORDER — AMPICILLIN SODIUM AND SULBACTAM SODIUM 250; 125 MG/ML; MG/ML
INJECTION, POWDER, FOR SUSPENSION INTRAMUSCULAR; INTRAVENOUS
Refills: 0 | Status: DISCONTINUED | OUTPATIENT
Start: 2022-01-01 | End: 2022-01-01

## 2022-01-01 RX ORDER — DESMOPRESSIN ACETATE 0.1 MG/1
0.5 TABLET ORAL
Refills: 0 | Status: DISCONTINUED | OUTPATIENT
Start: 2022-01-01 | End: 2022-01-01

## 2022-01-01 RX ORDER — SODIUM CHLORIDE 9 MG/ML
1000 INJECTION, SOLUTION INTRAVENOUS ONCE
Refills: 0 | Status: COMPLETED | OUTPATIENT
Start: 2022-01-01 | End: 2022-01-01

## 2022-01-01 RX ORDER — PIPERACILLIN AND TAZOBACTAM 4; .5 G/20ML; G/20ML
3.38 INJECTION, POWDER, LYOPHILIZED, FOR SOLUTION INTRAVENOUS EVERY 6 HOURS
Refills: 0 | Status: DISCONTINUED | OUTPATIENT
Start: 2022-01-01 | End: 2022-01-01

## 2022-01-01 RX ORDER — LEVOTHYROXINE SODIUM 125 MCG
20 TABLET ORAL ONCE
Refills: 0 | Status: COMPLETED | OUTPATIENT
Start: 2022-01-01 | End: 2022-01-01

## 2022-01-01 RX ORDER — SODIUM BICARBONATE 1 MEQ/ML
100 SYRINGE (ML) INTRAVENOUS ONCE
Refills: 0 | Status: DISCONTINUED | OUTPATIENT
Start: 2022-01-01 | End: 2022-01-01

## 2022-01-01 RX ORDER — SODIUM CHLORIDE 9 MG/ML
1000 INJECTION, SOLUTION INTRAVENOUS ONCE
Refills: 0 | Status: DISCONTINUED | OUTPATIENT
Start: 2022-01-01 | End: 2022-01-01

## 2022-01-01 RX ADMIN — Medication 100 MILLIEQUIVALENT(S): at 22:49

## 2022-01-01 RX ADMIN — AMPICILLIN SODIUM AND SULBACTAM SODIUM 100 GRAM(S): 250; 125 INJECTION, POWDER, FOR SUSPENSION INTRAMUSCULAR; INTRAVENOUS at 22:03

## 2022-01-01 RX ADMIN — Medication 100 MILLIEQUIVALENT(S): at 18:38

## 2022-01-01 RX ADMIN — DESMOPRESSIN ACETATE 0.5 MICROGRAM(S): 0.1 TABLET ORAL at 05:12

## 2022-01-01 RX ADMIN — CHLORHEXIDINE GLUCONATE 1 APPLICATION(S): 213 SOLUTION TOPICAL at 05:11

## 2022-01-01 RX ADMIN — POTASSIUM PHOSPHATE, MONOBASIC POTASSIUM PHOSPHATE, DIBASIC 83.33 MILLIMOLE(S): 236; 224 INJECTION, SOLUTION INTRAVENOUS at 08:27

## 2022-01-01 RX ADMIN — FENTANYL CITRATE 2 MICROGRAM(S)/KG/HR: 50 INJECTION INTRAVENOUS at 05:58

## 2022-01-01 RX ADMIN — AMPICILLIN SODIUM AND SULBACTAM SODIUM 100 GRAM(S): 250; 125 INJECTION, POWDER, FOR SUSPENSION INTRAMUSCULAR; INTRAVENOUS at 17:55

## 2022-01-01 RX ADMIN — Medication 100 MILLIEQUIVALENT(S): at 22:00

## 2022-01-01 RX ADMIN — AMPICILLIN SODIUM AND SULBACTAM SODIUM 100 GRAM(S): 250; 125 INJECTION, POWDER, FOR SUSPENSION INTRAMUSCULAR; INTRAVENOUS at 05:11

## 2022-01-01 RX ADMIN — CHLORHEXIDINE GLUCONATE 1 APPLICATION(S): 213 SOLUTION TOPICAL at 05:45

## 2022-01-01 RX ADMIN — AMPICILLIN SODIUM AND SULBACTAM SODIUM 100 GRAM(S): 250; 125 INJECTION, POWDER, FOR SUSPENSION INTRAMUSCULAR; INTRAVENOUS at 13:37

## 2022-01-01 RX ADMIN — AMPICILLIN SODIUM AND SULBACTAM SODIUM 100 GRAM(S): 250; 125 INJECTION, POWDER, FOR SUSPENSION INTRAMUSCULAR; INTRAVENOUS at 23:42

## 2022-01-01 RX ADMIN — CHLORHEXIDINE GLUCONATE 1 APPLICATION(S): 213 SOLUTION TOPICAL at 05:14

## 2022-01-01 RX ADMIN — AMPICILLIN SODIUM AND SULBACTAM SODIUM 100 GRAM(S): 250; 125 INJECTION, POWDER, FOR SUSPENSION INTRAMUSCULAR; INTRAVENOUS at 05:45

## 2022-01-01 RX ADMIN — AMPICILLIN SODIUM AND SULBACTAM SODIUM 100 GRAM(S): 250; 125 INJECTION, POWDER, FOR SUSPENSION INTRAMUSCULAR; INTRAVENOUS at 10:23

## 2022-01-01 RX ADMIN — CHLORHEXIDINE GLUCONATE 15 MILLILITER(S): 213 SOLUTION TOPICAL at 17:55

## 2022-01-01 RX ADMIN — Medication 750 GRAM(S): at 18:10

## 2022-01-01 RX ADMIN — SODIUM CHLORIDE 200 MILLILITER(S): 9 INJECTION, SOLUTION INTRAVENOUS at 14:40

## 2022-01-01 RX ADMIN — AMPICILLIN SODIUM AND SULBACTAM SODIUM 100 GRAM(S): 250; 125 INJECTION, POWDER, FOR SUSPENSION INTRAMUSCULAR; INTRAVENOUS at 02:21

## 2022-01-01 RX ADMIN — DESMOPRESSIN ACETATE 0.5 MICROGRAM(S): 0.1 TABLET ORAL at 19:12

## 2022-01-01 RX ADMIN — DESMOPRESSIN ACETATE 0.5 MICROGRAM(S): 0.1 TABLET ORAL at 11:10

## 2022-01-01 RX ADMIN — AMPICILLIN SODIUM AND SULBACTAM SODIUM 100 GRAM(S): 250; 125 INJECTION, POWDER, FOR SUSPENSION INTRAMUSCULAR; INTRAVENOUS at 13:11

## 2022-01-01 RX ADMIN — Medication 1 DROP(S): at 23:42

## 2022-01-01 RX ADMIN — Medication 8 MILLIGRAM(S): at 10:52

## 2022-01-01 RX ADMIN — Medication 7.5 MICROGRAM(S)/KG/MIN: at 12:36

## 2022-01-01 RX ADMIN — SODIUM CHLORIDE 1000 MILLILITER(S): 9 INJECTION, SOLUTION INTRAVENOUS at 10:52

## 2022-01-01 RX ADMIN — DESMOPRESSIN ACETATE 0.5 MICROGRAM(S): 0.1 TABLET ORAL at 01:00

## 2022-01-01 RX ADMIN — AMPICILLIN SODIUM AND SULBACTAM SODIUM 100 GRAM(S): 250; 125 INJECTION, POWDER, FOR SUSPENSION INTRAMUSCULAR; INTRAVENOUS at 05:13

## 2022-01-01 RX ADMIN — AMPICILLIN SODIUM AND SULBACTAM SODIUM 100 GRAM(S): 250; 125 INJECTION, POWDER, FOR SUSPENSION INTRAMUSCULAR; INTRAVENOUS at 04:15

## 2022-01-01 RX ADMIN — Medication 1 DROP(S): at 18:28

## 2022-01-01 RX ADMIN — SODIUM CHLORIDE 125 MILLILITER(S): 9 INJECTION, SOLUTION INTRAVENOUS at 15:43

## 2022-01-01 RX ADMIN — CHLORHEXIDINE GLUCONATE 15 MILLILITER(S): 213 SOLUTION TOPICAL at 17:39

## 2022-01-01 RX ADMIN — Medication 25 GRAM(S): at 03:38

## 2022-01-01 RX ADMIN — Medication 100 MILLIEQUIVALENT(S): at 03:38

## 2022-01-01 RX ADMIN — AMPICILLIN SODIUM AND SULBACTAM SODIUM 100 GRAM(S): 250; 125 INJECTION, POWDER, FOR SUSPENSION INTRAMUSCULAR; INTRAVENOUS at 18:11

## 2022-01-01 RX ADMIN — Medication 20 MICROGRAM(S): at 23:25

## 2022-01-01 RX ADMIN — CHLORHEXIDINE GLUCONATE 15 MILLILITER(S): 213 SOLUTION TOPICAL at 18:29

## 2022-01-01 RX ADMIN — AMPICILLIN SODIUM AND SULBACTAM SODIUM 100 GRAM(S): 250; 125 INJECTION, POWDER, FOR SUSPENSION INTRAMUSCULAR; INTRAVENOUS at 23:53

## 2022-01-01 RX ADMIN — Medication 1 DROP(S): at 05:15

## 2022-01-01 RX ADMIN — Medication 100 MILLIEQUIVALENT(S): at 20:51

## 2022-01-01 RX ADMIN — Medication 25 MICROGRAM(S)/HR: at 23:25

## 2022-01-01 RX ADMIN — Medication 100 MILLIEQUIVALENT(S): at 06:00

## 2022-01-01 RX ADMIN — Medication 100 MILLIEQUIVALENT(S): at 16:44

## 2022-01-01 RX ADMIN — Medication 1 DROP(S): at 02:21

## 2022-01-01 RX ADMIN — SODIUM CHLORIDE 100 MILLILITER(S): 9 INJECTION, SOLUTION INTRAVENOUS at 12:32

## 2022-01-01 RX ADMIN — PIPERACILLIN AND TAZOBACTAM 200 GRAM(S): 4; .5 INJECTION, POWDER, LYOPHILIZED, FOR SOLUTION INTRAVENOUS at 23:27

## 2022-01-01 RX ADMIN — PANTOPRAZOLE SODIUM 40 MILLIGRAM(S): 20 TABLET, DELAYED RELEASE ORAL at 12:32

## 2022-01-01 RX ADMIN — Medication 7.5 MICROGRAM(S)/KG/MIN: at 07:30

## 2022-01-01 RX ADMIN — Medication 100 MILLIEQUIVALENT(S): at 05:00

## 2022-01-01 RX ADMIN — Medication 1 DROP(S): at 05:46

## 2022-01-01 RX ADMIN — CHLORHEXIDINE GLUCONATE 15 MILLILITER(S): 213 SOLUTION TOPICAL at 05:45

## 2022-01-01 RX ADMIN — Medication 1 DROP(S): at 05:12

## 2022-01-01 RX ADMIN — Medication 100 MILLIGRAM(S): at 05:56

## 2022-01-01 RX ADMIN — AMPICILLIN SODIUM AND SULBACTAM SODIUM 100 GRAM(S): 250; 125 INJECTION, POWDER, FOR SUSPENSION INTRAMUSCULAR; INTRAVENOUS at 18:29

## 2022-01-01 RX ADMIN — CHLORHEXIDINE GLUCONATE 15 MILLILITER(S): 213 SOLUTION TOPICAL at 05:09

## 2022-01-01 RX ADMIN — Medication 1 DROP(S): at 17:55

## 2022-01-01 RX ADMIN — Medication 5 MILLIGRAM(S): at 22:49

## 2022-01-01 RX ADMIN — DESMOPRESSIN ACETATE 0.5 MICROGRAM(S): 0.1 TABLET ORAL at 15:42

## 2022-01-01 RX ADMIN — CHLORHEXIDINE GLUCONATE 15 MILLILITER(S): 213 SOLUTION TOPICAL at 05:14

## 2022-01-01 RX ADMIN — AMPICILLIN SODIUM AND SULBACTAM SODIUM 100 GRAM(S): 250; 125 INJECTION, POWDER, FOR SUSPENSION INTRAMUSCULAR; INTRAVENOUS at 15:11

## 2022-01-01 RX ADMIN — AMPICILLIN SODIUM AND SULBACTAM SODIUM 100 GRAM(S): 250; 125 INJECTION, POWDER, FOR SUSPENSION INTRAMUSCULAR; INTRAVENOUS at 11:09

## 2022-01-01 RX ADMIN — CHLORHEXIDINE GLUCONATE 15 MILLILITER(S): 213 SOLUTION TOPICAL at 18:10

## 2022-01-01 RX ADMIN — PANTOPRAZOLE SODIUM 40 MILLIGRAM(S): 20 TABLET, DELAYED RELEASE ORAL at 13:38

## 2022-01-01 RX ADMIN — PIPERACILLIN AND TAZOBACTAM 25 GRAM(S): 4; .5 INJECTION, POWDER, LYOPHILIZED, FOR SOLUTION INTRAVENOUS at 23:26

## 2022-01-01 RX ADMIN — Medication 100 MILLIEQUIVALENT(S): at 01:04

## 2022-01-01 RX ADMIN — Medication 1 DROP(S): at 11:08

## 2022-01-01 RX ADMIN — SODIUM CHLORIDE 1000 MILLILITER(S): 9 INJECTION INTRAMUSCULAR; INTRAVENOUS; SUBCUTANEOUS at 20:08

## 2022-01-01 RX ADMIN — SODIUM CHLORIDE 50 MILLILITER(S): 9 INJECTION, SOLUTION INTRAVENOUS at 01:04

## 2022-01-01 RX ADMIN — Medication 1 DROP(S): at 13:37

## 2022-01-01 RX ADMIN — Medication 1 DROP(S): at 17:39

## 2022-01-01 RX ADMIN — DESMOPRESSIN ACETATE 0.5 MICROGRAM(S): 0.1 TABLET ORAL at 17:56

## 2022-01-01 RX ADMIN — SODIUM CHLORIDE 200 MILLILITER(S): 9 INJECTION, SOLUTION INTRAVENOUS at 05:12

## 2022-01-01 RX ADMIN — PANTOPRAZOLE SODIUM 40 MILLIGRAM(S): 20 TABLET, DELAYED RELEASE ORAL at 11:08

## 2022-01-01 RX ADMIN — Medication 1 DROP(S): at 23:53

## 2022-01-01 RX ADMIN — Medication 100 MILLIEQUIVALENT(S): at 23:00

## 2022-01-01 RX ADMIN — MIDAZOLAM HYDROCHLORIDE 1.6 MG/KG/HR: 1 INJECTION, SOLUTION INTRAMUSCULAR; INTRAVENOUS at 05:58

## 2022-01-01 RX ADMIN — SODIUM CHLORIDE 30 MILLILITER(S): 5 INJECTION, SOLUTION INTRAVENOUS at 20:50

## 2022-01-01 RX ADMIN — ETOMIDATE 20 MILLIGRAM(S): 2 INJECTION INTRAVENOUS at 05:55

## 2022-01-01 RX ADMIN — Medication 1 DROP(S): at 12:32

## 2022-05-15 PROBLEM — F41.9 ANXIETY DISORDER, UNSPECIFIED: Chronic | Status: ACTIVE | Noted: 2018-08-04

## 2022-05-15 PROBLEM — M79.7 FIBROMYALGIA: Chronic | Status: ACTIVE | Noted: 2018-08-04

## 2022-05-15 NOTE — CONSULT NOTE ADULT - ASSESSMENT
IMPRESSION:    SP CPA  Acute Hypoxemic Respiratory Failure SP Intubation  Lactic Acidosis  HAGMA   GRACE       PLAN:    CNS: neuro checks, monitor off sedation, precedex if needed, CTH, EEG, urine and serum drug toxicology     HEENT: Oral care    PULMONARY:  HOB @ 45 degrees.  Aspiration precautions. Vent changes per ABG, ARDS network     CARDIOVASCULAR: goal directed fluid resuscitation, Targeted Temperature Management, target MAP >65    GI: GI prophylaxis.  OGT Feeding.  Bowel regimen     RENAL:  Follow up lytes.  Correct as needed. trend lactate, Sodium bicarb drip at 75cc/hr     INFECTIOUS DISEASE: Follow up cultures, monitor off ABX, check procalcitonin     HEMATOLOGICAL:  DVT prophylaxis.    ENDOCRINE:  Follow up FS.  Insulin protocol if needed.    MUSCULOSKELETAL: bedrest     MICU monitoring     Guarded prognosis          IMPRESSION:    SP CPA  Acute Hypoxemic Respiratory Failure SP Intubation  Lactic Acidosis, downtrending   HAGMA   GRACE       PLAN:    CNS: neuro checks, monitor off sedation, precedex if needed, CTH, EEG, urine and serum drug toxicology     HEENT: Oral care    PULMONARY:  HOB @ 45 degrees.  Aspiration precautions. Vent changes per ABG, ARDS network     CARDIOVASCULAR: goal directed fluid resuscitation, Targeted Temperature Management, target MAP >65, A-line    GI: GI prophylaxis.  OGT Feeding.  Bowel regimen     RENAL:  Follow up lytes.  Correct as needed. trend lactate    INFECTIOUS DISEASE: Follow up cultures, monitor off ABX, check procalcitonin     HEMATOLOGICAL:  DVT prophylaxis.    ENDOCRINE:  Follow up FS.  Insulin protocol if needed.    MUSCULOSKELETAL: bedrest     MICU monitoring     Guarded prognosis          IMPRESSION:    SP CPA  Acute Hypoxemic Respiratory Failure SP Intubation  Lactic Acidosis, downtrending   HAGMA   GRACE       PLAN:    CNS: neuro checks, monitor off sedation, precedex if needed, CTH, EEG, urine and serum drug toxicology     HEENT: Oral care    PULMONARY:  HOB @ 45 degrees.  Aspiration precautions. Vent changes increase RR 24, PEEP 8, ARDS network     CARDIOVASCULAR: goal directed fluid resuscitation, Targeted Temperature Management, target MAP >65, A-line    GI: GI prophylaxis.  OGT Feeding.  Bowel regimen     RENAL:  Follow up lytes.  Correct as needed. trend lactate    INFECTIOUS DISEASE: Follow up cultures, monitor off ABX, check procalcitonin     HEMATOLOGICAL:  DVT prophylaxis.    ENDOCRINE:  Follow up FS.  Insulin protocol if needed.    MUSCULOSKELETAL: bedrest     MICU monitoring     Guarded prognosis          IMPRESSION:    SP CPA  Acute Hypoxemic Respiratory Failure SP Intubation  Lactic Acidosis, downtrending   HAGMA   GRACE       PLAN:    CNS: neuro checks, monitor off sedation, precedex if needed, CTH, EEG, urine and serum drug toxicology     HEENT: Oral care    PULMONARY:  HOB @ 45 degrees.  Aspiration precautions. Vent changes increase RR 28, PEEP 8, ARDS network     CARDIOVASCULAR: goal directed fluid resuscitation, Targeted Temperature Management, target MAP >65, A-line    GI: GI prophylaxis.  OGT Feeding.  Bowel regimen     RENAL:  Follow up lytes.  Correct as needed. trend lactate    INFECTIOUS DISEASE: Follow up cultures, monitor off ABX, check procalcitonin     HEMATOLOGICAL:  DVT prophylaxis.    ENDOCRINE:  Follow up FS.  Insulin protocol if needed.    MUSCULOSKELETAL: bedrest     MICU monitoring     Guarded prognosis          IMPRESSION:    SP CPA  Acute Hypoxemic Respiratory Failure SP Intubation  Lactic Acidosis, downtrending   HAGMA   GRACE       PLAN:    CNS: neuro checks, monitor off sedation, precedex if needed, CTH, EEG, urine and serum drug toxicology     HEENT: Oral care    PULMONARY:  HOB @ 45 degrees.  Aspiration precautions. Vent changes increase RR 28, PEEP 8, ARDS network     CARDIOVASCULAR: goal directed fluid resuscitation IVC collapsible, give 1L LR bolus stat, wean off pressor, Targeted Temperature Management, target MAP >65, A-line    GI: GI prophylaxis.  OGT Feeding.  Bowel regimen     RENAL:  Follow up lytes.  Correct as needed. trend lactate    INFECTIOUS DISEASE: Follow up cultures, monitor off ABX, check procalcitonin     HEMATOLOGICAL:  DVT prophylaxis.    ENDOCRINE:  Follow up FS.  Insulin protocol if needed.    MUSCULOSKELETAL: bedrest     MICU monitoring     Guarded prognosis          IMPRESSION:    SP CPA  Acute Hypoxemic Respiratory Failure SP Intubation  Lactic Acidosis, downtrending   HAGMA   GRACE   Probable Drug Use    PLAN:    CNS: neuro checks, monitor off sedation, precedex if needed, CTH, EEG, urine and serum drug toxicology     HEENT: Oral care    PULMONARY:  HOB @ 45 degrees.  Aspiration precautions. Vent changes increase RR 28, PEEP 8, ARDS network     CARDIOVASCULAR: goal directed fluid resuscitation IVC collapsible, give 1L LR bolus stat, wean off pressor, Targeted Temperature Management, target MAP >65, A-line, 2D-Echo    GI: GI prophylaxis.  OGT Feeding.  Bowel regimen     RENAL:  Follow up lytes.  Correct as needed. trend lactate    INFECTIOUS DISEASE: Follow up cultures, monitor off ABX, check procalcitonin     HEMATOLOGICAL:  DVT prophylaxis.    ENDOCRINE:  Follow up FS.  Insulin protocol if needed.    MUSCULOSKELETAL: bedrest     MICU monitoring     Guarded prognosis          IMPRESSION:    SP CPA  Acute Hypoxemic Respiratory Failure SP Intubation  Lactic Acidosis, downtrending   HAGMA   GRACE   Probable Drug Use    PLAN:    CNS: neuro checks, monitor off sedation, precedex if needed, CTH, EEG, urine and serum drug toxicology     HEENT: Oral care    PULMONARY:  HOB @ 45 degrees.  Aspiration precautions. Vent changes increase RR 28, PEEP 8, ARDS network     CARDIOVASCULAR: goal directed fluid resuscitation IVC collapsible, give 1L LR bolus stat, continue LR@75, wean off pressor, Targeted Temperature Management, target MAP >65, A-line, 2D-Echo    GI: GI prophylaxis.  OGT in place, NPO for now, Bowel regiment     RENAL:  Follow up lytes.  Correct as needed. trend lactate    INFECTIOUS DISEASE: Follow up cultures, monitor off ABX, check procalcitonin     HEMATOLOGICAL:  DVT prophylaxis.    ENDOCRINE:  Follow up FS.  Insulin protocol if needed.    MUSCULOSKELETAL: bedrest     MICU monitoring     Guarded prognosis          IMPRESSION:    Acute Hypoxemic Respiratory Failure  SP CPA   Anoxic brain injury   Elevated ICP   Lactic Acidosis, downtrending   GRACE   Aspiration pneumonia   Probable Drug Use    PLAN:    CNS: FU MS.  Monitor off sedation.  Precedex if needed, CTH, EEG / VEEG.  Urine and serum drug toxicology.  Neuro critical eval NeuroSX .  NSE.  TTM. 3% NACL target Na 150       HEENT: Oral care    PULMONARY:  HOB @ 45 degrees.  Aspiration precautions. Vent changes increase RR 28, PEEP 8, ARDS network.  Repeat ABG and adjust vent settings.  Avoid suction ( can increase ICP )      CARDIOVASCULAR: goal directed fluid resuscitation IVC collapsible, give 1L LR bolus stat,3% NACL.  Wean pressor, target MAP >75 to improve CPP, A-line, 2D-Echo    GI: GI prophylaxis.  OGT in place, NPO for now, OG to suction.       RENAL:  Follow up lytes.  Correct as needed. trend lactate.  Monitor UO    INFECTIOUS DISEASE: Follow up cultures, Start Unasyn,  Procal.      HEMATOLOGICAL:  DVT prophylaxis.  Dimer     ENDOCRINE:  Follow up FS.  Insulin protocol if needed.      MUSCULOSKELETAL: bedrest     MICU monitoring     Guarded prognosis

## 2022-05-15 NOTE — H&P ADULT - ASSESSMENT
IMPRESSION:    SP CPA  Acute Hypoxemic Respiratory Failure SP Intubation  Lactic Acidosis, downtrending   HAGMA   GRACE   Probable Drug Use    PLAN:    CNS: neuro checks, monitor off sedation, precedex if needed, CTH, EEG, urine and serum drug toxicology. Cooling per neuro    HEENT: Oral care    PULMONARY:  HOB @ 45 degrees.  Aspiration precautions    CARDIOVASCULAR: goal directed fluid resuscitation. S/p 2 L., continue LR@75, wean off pressor, Targeted Temperature Management, target MAP >65, 2D-Echo    GI: GI prophylaxis. NPO for now, Bowel regiment     RENAL:  Follow up lytes. Correct as needed. trend lactate    INFECTIOUS DISEASE: Follow up cultures, monitor off ABX, check procalcitonin     HEMATOLOGICAL:  DVT prophylaxis.    ENDOCRINE: Follow up FS. Insulin protocol if needed.    MUSCULOSKELETAL: bedrest     MICU monitoring     Guarded prognosis    IMPRESSION:    SP CPA  Acute Hypoxemic Respiratory Failure SP Intubation  Lactic Acidosis, downtrending   HAGMA   GRACE   Probable Drug Use    PLAN:    CNS: neuro checks, monitor off sedation, CTH, vEEG, urine and serum drug toxicology, alcohol level    HEENT: Oral care    PULMONARY:  HOB @ 45 degrees.  Aspiration precautions    CARDIOVASCULAR: goal directed fluid resuscitation. S/p 2 L., continue LR@75, wean off pressor. Targeted Temperature Management per neuro: 95 degrees F for 24 hours; signed out to intern and nurse. target MAP >65, 2D-Echo    GI: GI prophylaxis. NPO for now, Bowel regiment     RENAL:  Follow up lytes. Correct as needed. trend lactate    INFECTIOUS DISEASE: Follow up cultures, monitor off ABX, check procalcitonin     HEMATOLOGICAL:  DVT prophylaxis.    ENDOCRINE: Follow up FS. Insulin protocol if needed.    MUSCULOSKELETAL: bedrest     MICU monitoring     Guarded prognosis

## 2022-05-15 NOTE — ED ADULT TRIAGE NOTE - CHIEF COMPLAINT QUOTE
Prenote for Cardiac arrest - Pt found unresponsive by family for possible drug overdose.  As per EMS CPR in progress, pt intubated in field

## 2022-05-15 NOTE — H&P ADULT - HISTORY OF PRESENT ILLNESS
33 y/o F unknown PMHx presents to ED in ROSC s/p cardiac arrest. Per EMS pt was w/ her friend and developed agonal breathing and went into asystole per EMS. LMA placed by EMS, given epi x3 and calcium x1, and 4 of intranasal narcan by family. ROSC obtain in ~20 minutes. Pt in ROSC on arrival to ED. 33 y/o F unknown PMHx presents to ED in ROSC s/p cardiac arrest. Per EMS pt was w/ her friend and developed agonal breathing and went into asystole per EMS. LMA placed by EMS, given epi x3 and calcium x1, and 4 of intranasal narcan by family. ROSC was obtained after ~ 20 minutes.  In ED patient was intubated and started on levophed. Lactate 11.3 -> 2.6.  Tried calling numbers in chart with no response.

## 2022-05-15 NOTE — CONSULT NOTE ADULT - CRITICAL CARE ATTENDING COMMENT
32 year old lady, who developed agonal breathing presumably due to opiate overdose, given Narcan but developed cardiac arrest with ROSC achieved after 20 minutes of CPR. Patient has GCS of 3 with fixed pupil and corneals.  Patient has core temp of 95 degree during exam, therefore, maintaining the same temperature for 24 hours via artic sun was recommended to primary team. Also, video EEG and minimizing sedation was also communicated.  Rest of assessment and systemic plan of care as outlined above.

## 2022-05-15 NOTE — H&P ADULT - NSHPPHYSICALEXAM_GEN_ALL_CORE
Vital Signs Last 24 Hrs  T(C): 35.1 (15 May 2022 10:28), Max: 35.8 (15 May 2022 05:49)  T(F): 95.2 (15 May 2022 10:28), Max: 96.5 (15 May 2022 05:49)  HR: 121 (15 May 2022 10:28) (102 - 130)  BP: 115/97 (15 May 2022 10:28) (95/50 - 153/89)  RR: 28 (15 May 2022 10:28) (16 - 28)  SpO2: 100% (15 May 2022 10:28) (92% - 100%)          GENERAL: NAD, lying in bed comfortably  HEAD:  Atraumatic, Normocephalic  EYES: EOMI, PERRLA, conjunctiva and sclera clear  ENT: Moist mucous membranes  NECK: Supple, No JVD  CHEST/LUNG: Clear to auscultation bilaterally; No rales, rhonchi, wheezing, or rubs. Unlabored respirations  HEART: Regular rate and rhythm; No murmurs, rubs, or gallops  ABDOMEN: Bowel sounds present; Soft, Nontender, Nondistended. No hepatomegally  EXTREMITIES:  2+ Peripheral Pulses, brisk capillary refill. No clubbing, cyanosis, or edema  NERVOUS SYSTEM:  Alert & Oriented X3, speech clear. No deficits   MSK: FROM all 4 extremities, full and equal strength  SKIN: No rashes or lesions Vital Signs Last 24 Hrs  T(C): 35.1 (15 May 2022 10:28), Max: 35.8 (15 May 2022 05:49)  T(F): 95.2 (15 May 2022 10:28), Max: 96.5 (15 May 2022 05:49)  HR: 121 (15 May 2022 10:28) (102 - 130)  BP: 115/97 (15 May 2022 10:28) (95/50 - 153/89)  RR: 28 (15 May 2022 10:28) (16 - 28)  SpO2: 100% (15 May 2022 10:28) (92% - 100%)          GENERAL: NAD off sedation does not follow commands  HEAD:  Atraumatic, Normocephalic  EYES: EOMI, PERRLA, conjunctiva and sclera clear  ENT: Moist mucous membranes  NECK: Supple, No JVD  CHEST/LUNG: Clear to auscultation bilaterally; No rales, rhonchi, wheezing, or rubs. Unlabored respirations  HEART: Regular rate and rhythm; No murmurs, rubs, or gallops  ABDOMEN: Bowel sounds present; Soft, Nontender  EXTREMITIES: No clubbing, cyanosis, or edema  NERVOUS SYSTEM: Does not follow commands, no spontaneous movementss

## 2022-05-15 NOTE — CONSULT NOTE ADULT - ASSESSMENT
32 year old with unknown PMHx s/p cardiac arrest    Recommendations:    - Minimize sedation- precedex with PRN Fentanyl  - CT Head when stable  - vEEG  - Normotension  - Normonatremia- no indication for hyperosmolar therapy, would recommend NS  - TTM to goal 35 for 24 hrs, then slow rewarm @ .25 C/hr until normothermic  - Aggressive prevention of secondary injury:         - Avoid fevers         - Use Meche Hugger for skin counter-warming to prevent shivering         - Continue BSAS protocol to monitor for shivering, which will increase metabolic demand         - Avoid hypoxia         - Buspar 30mg q12hrs for shivering         - Can utilize Magnesium gtt 4grams/250cc, rate 1-3cc/min, goal Magnesium 3-4

## 2022-05-15 NOTE — CONSULT NOTE ADULT - SUBJECTIVE AND OBJECTIVE BOX
Patient is a 32y old  Female who presents SP cardiac arrest    HPI: 33 yo F unknown PMHx presented to ED with ROSC s/p cardiac arrest, per EMS patient was with her friend when she  developed agonal breathing and went into asystole. LMA placed by EMS, given epi x3 and calcium x1, and 4 of intranasal narcan by family. ROSC obtain in ~20 minutes.       PAST MEDICAL & SURGICAL HISTORY:  Fibromyalgia      Anxiety and depression      SOCIAL HX: Unable to obtain    FAMILY HISTORY:  :  No known cardiovascular family history     Review Of Systems:     All ROS are negative except per HPI       Allergies    azithromycin (Unknown)    Intolerances          PHYSICAL EXAM    ICU Vital Signs Last 24 Hrs  T(C): --  T(F): --  HR: --  BP: --  BP(mean): --  ABP: --  ABP(mean): --  RR: --  SpO2: --      CONSTITUTIONAL:  NAD    ENT:   Airway patent,   Mouth with normal mucosa.   No thrush      CARDIAC:   Normal rate,   Regular rhythm.    No edema      Vascular:   normal systolic impulse  no bruits    RESPIRATORY:   No wheezing  Bilateral BS   Not tachypneic,  No use of accessory muscles    GASTROINTESTINAL:  Abdomen soft,   Non-tender,   No guarding,   + BS      NEUROLOGICAL:   not following commands    SKIN:   Skin normal color for race,   No evidence of rash.        LABS:                          11.9   13.79 )-----------( 286      ( 15 May 2022 05:59 )             38.7                                               05-15    136  |  100  |  9<L>  ----------------------------<  437<H>  4.3   |  12<L>  |  1.3    Ca    10.8<H>      15 May 2022 05:59  Mg     2.8     05-15    TPro  5.8<L>  /  Alb  3.4<L>  /  TBili  0.2  /  DBili  x   /  AST  76<H>  /  ALT  62<H>  /  AlkPhos  65  05-15                                                 CARDIAC MARKERS ( 15 May 2022 05:59 )  x     / <0.01 ng/mL / x     / x     / x                                                LIVER FUNCTIONS - ( 15 May 2022 05:59 )  Alb: 3.4 g/dL / Pro: 5.8 g/dL / ALK PHOS: 65 U/L / ALT: 62 U/L / AST: 76 U/L / GGT: x                                                                                            MEDICATIONS  (STANDING):  fentaNYL   Infusion... 0.5 MICROgram(s)/kG/Hr (2 mL/Hr) IV Continuous <Continuous>  midazolam Infusion 0.02 mG/kG/Hr (1.6 mL/Hr) IV Continuous <Continuous>        CXR interpreted by me: ETT in satisfactory position, no focal consolidations, opacities or effusions, distended stomach         Patient is a 32y old  Female who presents SP cardiac arrest    HPI: 31 yo F unknown PMHx presented to ED with ROSC s/p cardiac arrest, per EMS patient was with her friend when she  developed agonal breathing and went into asystole. LMA placed by EMS, given epi x3 and calcium x1, and 4 of intranasal narcan by family. ROSC obtain in ~20 minutes.       PAST MEDICAL & SURGICAL HISTORY:  Fibromyalgia      Anxiety and depression      SOCIAL HX: Unable to obtain    FAMILY HISTORY:  :  No known cardiovascular family history     Review Of Systems:     All ROS are negative except per HPI       Allergies    azithromycin (Unknown)    Intolerances          PHYSICAL EXAM    ICU Vital Signs Last 24 Hrs  T(C): --  T(F): --  HR: --  BP: --  BP(mean): --  ABP: --  ABP(mean): --  RR: --  SpO2: --      CONSTITUTIONAL:  NAD    ENT:   Dilated pupils  Airway patent,   Mouth with normal mucosa.   No thrush      CARDIAC:   Normal rate,   Regular rhythm.    No edema      Vascular:   normal systolic impulse  no bruits    RESPIRATORY:   No wheezing  Bilateral BS   Not tachypneic,  No use of accessory muscles    GASTROINTESTINAL:  Abdomen soft,   Non-tender,   No guarding,   + BS      NEUROLOGICAL:   not following commands    SKIN:   Skin normal color for race,   No evidence of rash.        LABS:                          11.9   13.79 )-----------( 286      ( 15 May 2022 05:59 )             38.7                                               05-15    136  |  100  |  9<L>  ----------------------------<  437<H>  4.3   |  12<L>  |  1.3    Ca    10.8<H>      15 May 2022 05:59  Mg     2.8     05-15    TPro  5.8<L>  /  Alb  3.4<L>  /  TBili  0.2  /  DBili  x   /  AST  76<H>  /  ALT  62<H>  /  AlkPhos  65  05-15                                                 CARDIAC MARKERS ( 15 May 2022 05:59 )  x     / <0.01 ng/mL / x     / x     / x                                                LIVER FUNCTIONS - ( 15 May 2022 05:59 )  Alb: 3.4 g/dL / Pro: 5.8 g/dL / ALK PHOS: 65 U/L / ALT: 62 U/L / AST: 76 U/L / GGT: x                                                                                            MEDICATIONS  (STANDING):  fentaNYL   Infusion... 0.5 MICROgram(s)/kG/Hr (2 mL/Hr) IV Continuous <Continuous>  midazolam Infusion 0.02 mG/kG/Hr (1.6 mL/Hr) IV Continuous <Continuous>        CXR interpreted by me: ETT in satisfactory position, no focal consolidations, opacities or effusions, distended stomach         Patient is a 32y old  Female who presents SP cardiac arrest    HPI: 31 yo F unknown PMHx presented to ED with ROSC s/p cardiac arrest, per EMS patient was with her friend when she  developed agonal breathing and went into asystole. LMA placed by EMS, given epi x3 and calcium x1, and 4 of intranasal narcan by family. ROSC obtain in ~20 minutes.       PAST MEDICAL & SURGICAL HISTORY:  Fibromyalgia      Anxiety and depression      SOCIAL HX: Unable to obtain    FAMILY HISTORY:  :  No known cardiovascular family history     Review Of Systems:     All ROS are negative except per HPI       Allergies    azithromycin (Unknown)    Intolerances          CONSTITUTIONAL:  NAD    ENT:   Dilated pupils  Airway patent,   Mouth with normal mucosa.   No thrush      CARDIAC:   Normal rate,   Regular rhythm.    No edema      Vascular:   normal systolic impulse  no bruits    RESPIRATORY:   No wheezing  Bilateral BS   Not tachypneic,  No use of accessory muscles    GASTROINTESTINAL:  Abdomen soft,   Non-tender,   No guarding,   + BS      NEUROLOGICAL:   not following commands    SKIN:   Skin normal color for race,   No evidence of rash.        LABS:                          11.9   13.79 )-----------( 286      ( 15 May 2022 05:59 )             38.7                                               05-15    136  |  100  |  9<L>  ----------------------------<  437<H>  4.3   |  12<L>  |  1.3    Ca    10.8<H>      15 May 2022 05:59  Mg     2.8     05-15    TPro  5.8<L>  /  Alb  3.4<L>  /  TBili  0.2  /  DBili  x   /  AST  76<H>  /  ALT  62<H>  /  AlkPhos  65  05-15                                                 CARDIAC MARKERS ( 15 May 2022 05:59 )  x     / <0.01 ng/mL / x     / x     / x                                                LIVER FUNCTIONS - ( 15 May 2022 05:59 )  Alb: 3.4 g/dL / Pro: 5.8 g/dL / ALK PHOS: 65 U/L / ALT: 62 U/L / AST: 76 U/L / GGT: x                                                                                            MEDICATIONS  (STANDING):  fentaNYL   Infusion... 0.5 MICROgram(s)/kG/Hr (2 mL/Hr) IV Continuous <Continuous>  midazolam Infusion 0.02 mG/kG/Hr (1.6 mL/Hr) IV Continuous <Continuous>

## 2022-05-15 NOTE — ED ADULT NURSE NOTE - OBJECTIVE STATEMENT
31 y/o female BIBA s/p cardiac arrest. as per EMS, family suspected overdose and admin 4mg of narcan intranasal. EMS reports pt did not respond and was asystole when they arrived on scene. EMS admin 3 rounds of epi and achieve ROSC after 20 minutes. calcium chloride admin after ROSC.

## 2022-05-15 NOTE — H&P ADULT - NSHPLABSRESULTS_GEN_ALL_CORE
LABS:  cret                        11.9   13.79 )-----------( 286      ( 15 May 2022 05:59 )             38.7     05-15    136  |  100  |  9<L>  ----------------------------<  437<H>  4.3   |  12<L>  |  1.3    Ca    10.8<H>      15 May 2022 05:59  Mg     2.8     05-15    TPro  5.8<L>  /  Alb  3.4<L>  /  TBili  0.2  /  DBili  x   /  AST  76<H>  /  ALT  62<H>  /  AlkPhos  65  05-15

## 2022-05-15 NOTE — ED PROVIDER NOTE - PROGRESS NOTE DETAILS
Authored by Staci Wilkerson DO: Pt intubated and central line placed on arrival. Authored by Staci Wilkerson, DO: Aware of CXR, ET tube in place, OG tube advanced. Authored by Staci Wilkerson DO: Approved for ICU by ICU fellow Dr. Mariscal, targeted temp management initiated, echo showing hyperdynamic heart w/o signs of tamponade.

## 2022-05-15 NOTE — CONSULT NOTE ADULT - SUBJECTIVE AND OBJECTIVE BOX
HPI:  31 y/o F unknown PMHx presents to ED in ROSC s/p cardiac arrest. Per EMS pt was w/ her friend and developed agonal breathing and went into asystole per EMS. LMA placed by EMS, given epi x3 and calcium x1, and 4 of intranasal narcan by family. ROSC was obtained after ~ 20 minutes.  In ED patient was intubated and started on levophed. Lactate 11.3 -> 2.6.       (15 May 2022 11:32)        PAST MEDICAL & SURGICAL HISTORY:  Fibromyalgia  Anxiety and depression    Home Medications:    Allergies  azithromycin (Unknown)    Intolerances    MEDICATIONS  (STANDING):  dexMEDEtomidine Infusion 0.05 MICROgram(s)/kG/Hr (1 mL/Hr) IV Continuous <Continuous>  fentaNYL   Infusion... 0.5 MICROgram(s)/kG/Hr (2 mL/Hr) IV Continuous <Continuous>  lactated ringers Bolus 1000 milliLiter(s) IV Bolus once  lactated ringers. 1000 milliLiter(s) (75 mL/Hr) IV Continuous <Continuous>  mannitol 20% IVPB 75 Gram(s) IV Intermittent once  norepinephrine Infusion 0.05 MICROgram(s)/kG/Min (7.5 mL/Hr) IV Continuous <Continuous>  vasopressin Infusion 0.04 Unit(s)/Min (2.4 mL/Hr) IV Continuous <Continuous>    MEDICATIONS  (PRN):      ICU Vital Signs Last 24 Hrs  T(C): 34.5 (15 May 2022 14:00), Max: 35.8 (15 May 2022 05:49)  T(F): 94.1 (15 May 2022 14:00), Max: 96.5 (15 May 2022 05:49)  HR: 96 (15 May 2022 14:15) (96 - 130)  BP: 115/97 (15 May 2022 10:28) (95/50 - 153/89)  BP(mean): --  ABP: 142/106 (15 May 2022 14:15) (100/90 - 154/106)  ABP(mean): 124 (15 May 2022 14:15) (96 - 128)  RR: 28 (15 May 2022 14:15) (16 - 28)  SpO2: 100% (15 May 2022 14:15) (92% - 100%)      I&O's Detail    15 May 2022 07:01  -  15 May 2022 15:03  --------------------------------------------------------  IN:    FentaNYL: 4 mL    Lactated Ringers: 75 mL    Lactated Ringers Bolus: 2000 mL    Norepinephrine: 77.8 mL  Total IN: 2156.8 mL    OUT:    Indwelling Catheter - Urethral (mL): 2600 mL  Total OUT: 2600 mL    Total NET: -443.2 mL      CBC Full  -  ( 15 May 2022 05:59 )  WBC Count : 13.79 K/uL  RBC Count : 4.11 M/uL  Hemoglobin : 11.9 g/dL  Hematocrit : 38.7 %  Platelet Count - Automated : 286 K/uL  Mean Cell Volume : 94.2 fL  Mean Cell Hemoglobin : 29.0 pg  Mean Cell Hemoglobin Concentration : 30.7 g/dL  Auto Neutrophil # : 7.92 K/uL  Auto Lymphocyte # : 4.80 K/uL  Auto Monocyte # : 0.36 K/uL  Auto Eosinophil # : 0.11 K/uL  Auto Basophil # : 0.00 K/uL  Auto Neutrophil % : 57.4 %  Auto Lymphocyte % : 34.8 %  Auto Monocyte % : 2.6 %  Auto Eosinophil % : 0.8 %  Auto Basophil % : 0.0 %    05-15    136  |  100  |  9<L>  ----------------------------<  437<H>  4.3   |  12<L>  |  1.3    Ca    10.8<H>      15 May 2022 05:59  Mg     2.8     05-15    TPro  5.8<L>  /  Alb  3.4<L>  /  TBili  0.2  /  DBili  x   /  AST  76<H>  /  ALT  62<H>  /  AlkPhos  65  05-15    CARDIAC MARKERS ( 15 May 2022 05:59 )  x     / <0.01 ng/mL / x     / x     / x            EXAM  patient intubated not sedated, not breathing over vent, pupils fixed and fully dilated, + dolls eyes reflex, no corneal reflex, , no gag reflex, no movement of extremities to painful stimuli.      Imaging: < from: CT Head No Cont (05.15.22 @ 11:50) >    IMPRESSION:  Diffuse loss of gray-white matter differentiation as well as diffuse   cerebral edema. There is effacement of the basal cisterns as well as   downward central herniation and bilateral uncal herniation. There is   complete effacement of the third ventricle with near complete effacement   of the fourth ventricle. Findings represent diffuse anoxic brain injury   and increased intracranial pressure.    < end of copied text >      Assessment/Plan- Brain death protocol by neuro critical care.

## 2022-05-15 NOTE — ED ADULT NURSE NOTE - NSIMPLEMENTINTERV_GEN_ALL_ED
Implemented All Fall with Harm Risk Interventions:  [place] to call system. Call bell, personal items and telephone within reach. Instruct patient to call for assistance. Room bathroom lighting operational. Non-slip footwear when patient is off stretcher. Physically safe environment: no spills, clutter or unnecessary equipment. Stretcher in lowest position, wheels locked, appropriate side rails in place. Provide visual cue, wrist band, yellow gown, etc. Monitor gait and stability. Monitor for mental status changes and reorient to person, place, and time. Review medications for side effects contributing to fall risk. Reinforce activity limits and safety measures with patient and family. Provide visual clues: red socks.

## 2022-05-15 NOTE — ED PROVIDER NOTE - OBJECTIVE STATEMENT
31 y/o F unknown PMHx presents to ED in ROSC s/p cardiac arrest. Per EMS pt was w/ her friend and developed agonal breathing and went into asystole per EMS. LMA placed by EMS, given epi x3 and calcium x1, and 4 of intranasal narcan by family. ROSC obtain in ~20 minutes. Pt in ROSC on arrival to ED.

## 2022-05-15 NOTE — PATIENT PROFILE ADULT - FALL HARM RISK - RISK INTERVENTIONS

## 2022-05-15 NOTE — CONSULT NOTE ADULT - SUBJECTIVE AND OBJECTIVE BOX
SUMMARY: 33 y/o F unknown PMHx presents to ED in ROSC s/p cardiac arrest. Per EMS pt was w/ her friend and developed agonal breathing and went into asystole per EMS. LMA placed by EMS, given epi x3 and calcium x1, and 4 of intranasal narcan by family. ROSC obtain in ~20 minutes. Pt in ROSC on arrival to ED.      ALLERGIES: Allergies    azithromycin (Unknown)    Intolerances      ADMISSION SCORES:   GCS: 3      REVIEW OF SYSTEMS: Negative except for that of HPI    VITALS: [x] Reviewed  ICU Vital Signs Last 24 Hrs  T(C): 33.3 (15 May 2022 07:33), Max: 35.8 (15 May 2022 05:49)  T(F): 92 (15 May 2022 07:33), Max: 96.5 (15 May 2022 05:49)  HR: 117 (15 May 2022 07:33) (102 - 130)  BP: 111/68 (15 May 2022 07:33) (95/50 - 153/89)  BP(mean): --  ABP: --  ABP(mean): --  RR: 28 (15 May 2022 07:33) (16 - 28)  SpO2: 92% (15 May 2022 07:33) (92% - 99%)      LABS:  Na: 136 (05-15 @ 05:59)  K: 4.3 (05-15 @ 05:59)  Cl: 100 (05-15 @ 05:59)  CO2: 12 (05-15 @ 05:59)  BUN: 9 (05-15 @ 05:59)  Cr: 1.3 (05-15 @ 05:59)  Glu: 437(05-15 @ 05:59)    Hgb: 11.9 (05-15 @ 05:59)  Hct: 38.7 (05-15 @ 05:59)  WBC: 13.79 (05-15 @ 05:59)  Plt: 286 (05-15 @ 05:59)    INR:   PTT:           LIVER FUNCTIONS - ( 15 May 2022 05:59 )  Alb: 3.4 g/dL / Pro: 5.8 g/dL / ALK PHOS: 65 U/L / ALT: 62 U/L / AST: 76 U/L / GGT: x                 MEDICATIONS  (STANDING):  dexMEDEtomidine Infusion 0.05 MICROgram(s)/kG/Hr (1 mL/Hr) IV Continuous <Continuous>  fentaNYL   Infusion... 0.5 MICROgram(s)/kG/Hr (2 mL/Hr) IV Continuous <Continuous>    MEDICATIONS  (PRN):      IMAGING/DATA: [x] Reviewed    EXAMINATION:  General: No acute distress  HEENT: Anicteric sclerae  Cardiac: [id number]  Lungs: Clear  Abdomen: Soft, non-tender, +BS  Extremities: No c/c/e  Skin/Incision Site: Clean, dry and intact  Neurologic: Comatose, unable to follow commands, pupils 5 mm fixed, -btt, -corneals, - gag/cough, - occulocephalic , no response to noxious throughout      DEVICES:   [] Restraints [] PIVs [] ET tube [] central line [] PICC [] arterial line [] padron [] NGT/OGT [] EVD [] LD [] ABIGAIL/HMV [] LiCOX [] ICP monitor [] Trach [] PEG [] Chest Tube [] other:

## 2022-05-15 NOTE — CHART NOTE - NSCHARTNOTEFT_GEN_A_CORE
Discussed with pulm/crit fellow  Will DC LR and start 3% NS @30cc   sodium not to exceed 150. BMP pending for 20:00 and 23:30, please follow up  Will send neuron specific enolase, please follow up   Will repeat ABG now     Ct head showing Diffuse loss of gray-white matter differentiation as well as diffuse cerebral edema.   There is effacement of the basal cisterns as well as downward central herniation and bilateral uncal herniation.   There is complete effacement of the third ventricle with near complete effacement of the fourth ventricle.  Findings represent diffuse anoxic brain injury and increased intracranial pressure.    Findings discussed with neurosurgery team. No intervention at this time. To follow with Neurocritical care team for brain death protocol. Discussed with pulm/crit fellow  Will DC LR and start 3% NS @30cc   sodium not to exceed 150. BMP pending for 20:00 and 23:30, please follow up  Will send neuron specific enolase, please follow up   Will repeat ABG now  Maintain MAP>75    Ct head showing Diffuse loss of gray-white matter differentiation as well as diffuse cerebral edema.   There is effacement of the basal cisterns as well as downward central herniation and bilateral uncal herniation.   There is complete effacement of the third ventricle with near complete effacement of the fourth ventricle.  Findings represent diffuse anoxic brain injury and increased intracranial pressure.    Findings discussed with neurosurgery team. No intervention at this time. To follow with Neurocritical care team for brain death protocol. Discussed with pulm/crit fellow  s/p 75g manitol bolus  Will DC LR and start 3% NS @30cc   sodium not to exceed 150. BMP and serum osmolality pending for 20:00 and 23:30, please follow up  Will send neuron specific enolase, please follow up   Will repeat ABG now  Maintain MAP>75    Ct head showing Diffuse loss of gray-white matter differentiation as well as diffuse cerebral edema.   There is effacement of the basal cisterns as well as downward central herniation and bilateral uncal herniation.   There is complete effacement of the third ventricle with near complete effacement of the fourth ventricle.  Findings represent diffuse anoxic brain injury and increased intracranial pressure.    Findings discussed with neurosurgery team. No intervention at this time. To follow with Neurocritical care team for brain death protocol.

## 2022-05-15 NOTE — ED PROCEDURE NOTE - CPROC ED INFUS LINE DETAIL1
The location was identified, and the area was draped and prepped./Ultrasound guidance was used./The guidewire was recovered./All lumen(s) aspirated and flushed without difficulty.

## 2022-05-15 NOTE — PATIENT PROFILE ADULT - IS PATIENT PREGNANT?
Subjective: The patient is a 54 y.o. obese female scheduled for laparoscopic gastric bypass. Body mass index is 48.18 kg/m². Becca Caicedo has tried multiple diets in her  lifetime most recently trying unsupervised diets during which she was able to lose small amounts of weight. Bariatric comorbidities present are   Past Medical History:   Diagnosis Date    ADD (attention deficit disorder)     Anxiety     Anxiety     Carpal tunnel syndrome     left    Chronic pain     COVID-19 ruled out     COVID-19 vaccine series completed     4/5/21,4/28/21    DJD (degenerative joint disease)     Endocrine disease     hypothyroidism    H. PYLORI INFECTION     treated with triple therapy in Fall 2008    Hypertension     Sleep apnea     Unspecified hypothyroidism        Patient Active Problem List    Diagnosis Date Noted    Gout 04/16/2021    Hyperlipidemia 04/16/2021    Steatosis of liver 11/10/2020    Gastroesophageal reflux disease without esophagitis 11/05/2020    CRP elevated 03/28/2017    Morbid obesity ([address] 06/03/2016    OCD (obsessive compulsive disorder) 06/03/2016    Anxiety 06/03/2016    ADD (attention deficit disorder) 06/03/2016    HTN (hypertension) 05/28/2014    Obstructive sleep apnea 05/28/2014    Hip pain, right 05/28/2014    Congers hump 06/22/2010    Hypothyroidism      Past Medical History:   Diagnosis Date    ADD (attention deficit disorder)     Anxiety     Anxiety     Carpal tunnel syndrome     left    Chronic pain     COVID-19 ruled out     COVID-19 vaccine series completed     4/5/21,4/28/21    DJD (degenerative joint disease)     Endocrine disease     hypothyroidism    H.  PYLORI INFECTION     treated with triple therapy in Fall 2008    Hypertension     Sleep apnea     Unspecified hypothyroidism       Past Surgical History:   Procedure Laterality Date    HX GYN      Bartholin cyst removal    HX TONSILLECTOMY  1988    HX TUBAL LIGATION  2005    CT EXCIS BARTHOLIN GLAND/CYST        Social History     Tobacco Use    Smoking status: Never Smoker    Smokeless tobacco: Never Used   Substance Use Topics    Alcohol use: No      Family History   Problem Relation Age of Onset    Hypertension Mother     Hypertension Father     Stroke Father         age 79    Thyroid Disease Sister         hypothyroidism    Arthritis-rheumatoid Sister     Anesth Problems Neg Hx       Prior to Admission medications    Medication Sig Start Date End Date Taking? Authorizing Provider   levothyroxine (SYNTHROID) 200 mcg tablet Take 1 Tablet by mouth Daily (before breakfast). 6/24/21  Yes Romain Bashir MD   levothyroxine (SYNTHROID) 25 mcg tablet Take 1 Tablet by mouth Daily (before breakfast). 6/24/21  Yes Romain Bashir MD   multivitamin (ONE A DAY) tablet Take 1 Tablet by mouth daily. Yes Provider, Historical   spironolactone-hydrochlorothiazide (ALDACTAZIDE) 25-25 mg per tablet Take 1 Tab by mouth daily. Patient taking differently: Take 1 Tablet by mouth daily (after breakfast). 11/10/20  Yes Magalis Rubio MD   ondansetron (ZOFRAN ODT) 4 mg disintegrating tablet Take 1 Tablet by mouth every eight (8) hours as needed for Nausea or Nausea or Vomiting (AFTER SURGERY). Patient not taking: Reported on 6/29/2021 6/22/21   Taylor JacksonstKATIE   polyethylene glycol [facility]) 17 gram packet Take 1 Packet by mouth daily. Indications: constipation  Patient not taking: Reported on 6/29/2021 6/22/21   Taylor JacksonstKATIE   omeprazole (PRILOSEC) 40 mg capsule Take 1 Capsule by mouth daily.  AFTER SURGERY  Patient not taking: Reported on 6/29/2021 6/22/21   Taylor JacksonstKATIE     Allergies   Allergen Reactions    Darvocet A500 [Propoxyphene N-Acetaminophen] Hives    Sertraline Other (comments)     Intolerance         Objective:     Visit Vitals  /82   Pulse 69   Temp 98 °F (36.7 °C)   Resp 20   Ht 5' 5\" (1.651 m)   Wt 289 lb 8 oz (131.3 kg)   SpO2 96%   BMI 48.18 kg/m² Assessment:     Morbid obesity with comorbidities; no success with medical management. Plan:     Laparoscopic gastric bypass with hiatal hernia repair and upper endoscopy. Technical aspects of procedure reviewed along with risks (to include but not limited to bleeding, wound infection, VTE, open procedure, leak, ulcer, stricture, persistent reflux symptoms, poor weight loss/weight regain). Also reviewed anticipated hospital course, postoperative diet, and activity restrictions. She understands and desires to proceed. All questions answered. 27 minutes spent with patient (greater than 50% of time in face-face consultation reviewing potential risks, anticipated hospital course, postoperative diet, activity restrictions).       Signed By: Tammy Connolly MD     July 1, 2021 no

## 2022-05-15 NOTE — CONSULT NOTE ADULT - SUBJECTIVE AND OBJECTIVE BOX
Cardiologist: none    HPI:  31 y/o F PMHx fibromyalgia, anxiety, depression presents to ED in ROSC s/p cardiac arrest. Per EMS pt was w/ her friend and developed agonal breathing and went into asystole per EMS. LMA placed by EMS, given epi x3 and calcium x1, and 4 of intranasal narcan by family. ROSC obtain in ~20 minutes. Pt in ROSC on arrival to ED. (15 May 2022 11:32)      PAST MEDICAL & SURGICAL HISTORY  Fibromyalgia  Anxiety and depression    FAMILY HISTORY:  FAMILY HISTORY:    [ ] no pertinent family history of premature cardiovascular disease in first degree relatives.  Mother:   Father:   Siblings:     SOCIAL HISTORY:  []smoker  []Alcohol  [x]Drug: opiod, sedatives    ALLERGIES:  azithromycin (Unknown)    MEDICATIONS:  MEDICATIONS  (STANDING):  dexMEDEtomidine Infusion 0.05 MICROgram(s)/kG/Hr (1 mL/Hr) IV Continuous <Continuous>  fentaNYL   Infusion... 0.5 MICROgram(s)/kG/Hr (2 mL/Hr) IV Continuous <Continuous>  lactated ringers Bolus 1000 milliLiter(s) IV Bolus once  norepinephrine Infusion 0.05 MICROgram(s)/kG/Min (7.5 mL/Hr) IV Continuous <Continuous>  vasopressin Infusion 0.04 Unit(s)/Min (2.4 mL/Hr) IV Continuous <Continuous>    MEDICATIONS  (PRN):      HOME MEDICATIONS:  Home Medications:      VITALS:   T(F): 95.2 (05-15 @ 10:28), Max: 96.5 (05-15 @ 05:49)  HR: 121 (05-15 @ 10:28) (102 - 130)  BP: 115/97 (05-15 @ 10:28) (95/50 - 153/89)  BP(mean): --  RR: 28 (05-15 @ 10:28) (16 - 28)  SpO2: 100% (05-15 @ 10:28) (92% - 100%)    I&O's Summary    15 May 2022 07:01  -  15 May 2022 12:38  --------------------------------------------------------  IN: 0 mL / OUT: 1200 mL / NET: -1200 mL        REVIEW OF SYSTEMS:    Unable to obtain    PHYSICAL EXAM:  NEURO: patient is sedated  GEN: Not in acute distress  NECK: no thyroid enlargement, no JVD  LUNGS: Clear to auscultation bilaterally   CARDIOVASCULAR: S1/S2 present, RRR , no murmurs or rubs, no carotid bruits,  + PP bilaterally  ABD: Soft, non-tender, non-distended, +BS  EXT: No CARLOS ALBERTO  SKIN: Intact    LABS:                        11.9   13.79 )-----------( 286      ( 15 May 2022 05:59 )             38.7     05-15    136  |  100  |  9<L>  ----------------------------<  437<H>  4.3   |  12<L>  |  1.3    Ca    10.8<H>      15 May 2022 05:59  Mg     2.8     05-15    TPro  5.8<L>  /  Alb  3.4<L>  /  TBili  0.2  /  DBili  x   /  AST  76<H>  /  ALT  62<H>  /  AlkPhos  65  05-15      Troponin T, Serum: <0.01 ng/mL (05-15-22 @ 05:59)    CARDIAC MARKERS ( 15 May 2022 05:59 )  x     / <0.01 ng/mL / x     / x     / x        Serum Pro-Brain Natriuretic Peptide: 215 pg/mL (05-15-22 @ 05:59)    RADIOLOGY:  -CXR:  < from: Xray Chest 1 View-PORTABLE IMMEDIATE (Xray Chest 1 View-PORTABLE IMMEDIATE .) (05.15.22 @ 07:52) >  Impression:    Bibasilar opacities. New small left pleural effusion.    < end of copied text >    -TTE:    -CCTA:    -STRESS TEST:    -CATHETERIZATION:    ECG:  < from: 12 Lead ECG (05.15.22 @ 06:22) >  Ventricular Rate 129 BPM    Atrial Rate 129 BPM    P-R Interval 162 ms    QRS Duration 88 ms    Q-T Interval 304 ms    QTC Calculation(Bazett) 445 ms    P Axis 72 degrees    R Axis 70 degrees    T Axis 47 degrees    Diagnosis Line Sinus tachycardia  Right atrial enlargement  Nonspecific ST abnormality  Abnormal ECG    < end of copied text >      TELEMETRY EVENTS: sinus bradycardia

## 2022-05-15 NOTE — CONSULT NOTE ADULT - ATTENDING COMMENTS
Patient was seen and examined with the Cardiology Fellow. Therapy and recommendations as outlined above.
IMPRESSION:    Acute Hypoxemic Respiratory Failure  SP CPA   Anoxic brain injury   Lactic Acidosis, downtrending   GRACE   Aspiration pneumonia   Probable Drug Use    Plan as outlined above

## 2022-05-15 NOTE — CHART NOTE - NSCHARTNOTEFT_GEN_A_CORE
No contacts in chart, no contact for next of kin  Contacted local police precinct - possible contacts for patient     [phone] Varghese Alfaro  [phone] Suze Alfaro  [phone] Juan Alfaro

## 2022-05-15 NOTE — ED PROVIDER NOTE - ATTENDING CONTRIBUTION TO CARE
32-year-old female unknown past medical history presents with cardiac arrest.  Per EMS patient was with her friend and started developing agonal breathing and went into asystolic cardiac arrest.  Cuauhtemoc tube placed by EMS, given 3 rounds of epi and 1 dose of calcium.  Patient also given 4 mg intranasal Narcan by family prior to EMS arrival.  Patient in cardiac arrest for approximately 20 minutes and ROSC obtained in the field.    CONSTITUTIONAL: Unresponsive, Cuauhtemoc tube in place.  SKIN: skin exam is warm and dry, no acute rash.  HEAD: Normocephalic; atraumatic.  EYES: Pupils dilated, round and reactive.  Normal conjunctive and sclera.  ENT: Cuauhtemoc tube in place.  NECK: No signs of trauma, no JVD, no tracheal deviation.  CARD: S1, S2 normal; no murmurs, gallops, or rubs. Regular rate and rhythm. + Symmetric Strong Pulses  RESP: No wheezes, rales or rhonchi. Good air movement bilaterally  ABD: soft; non-distended; non-tender. No Rebound, No Guarding, No signs of peritonitis, No CVA tenderness. No pulsatile abdominal mass. + Strong and Symmetric Pulses  EXT: No clubbing, cyanosis or edema. Dp and Pt Pulses intact. Cap refill less than 3 seconds  NEURO: Patient moving initially while in the ED, eyes tracking.  No facial droop.  Patient unresponsive.

## 2022-05-15 NOTE — ED PROVIDER NOTE - CLINICAL SUMMARY MEDICAL DECISION MAKING FREE TEXT BOX
I personally evaluated the patient. I reviewed the Resident´s or Physician Assistant´s note (as assigned above), and agree with the findings and plan except as documented in my note.  Patient evaluated postcardiac arrest.  Patient in cardiac arrest in the field, ROSC achieved prior to ED evaluation.  Patient intubated upon arrival to the ED.  Central line placed.  Labs, EKG, chest x-ray performed.  Discussed with ICU team, patient approved for ICU admission.  Patient admitted to ICU for further evaluation and treatment.

## 2022-05-15 NOTE — CONSULT NOTE ADULT - ASSESSMENT
Cardiac arrest, asystole in field s/p 20min CPR with ROSC  Anxiety  Depression    -drug screen/toxicity panel  -TTE  -TTM per neuro  -care per MICU

## 2022-05-15 NOTE — ED PROVIDER NOTE - PHYSICAL EXAMINATION
CONSTITUTIONAL: post-arrest   SKIN: warm, dry  HEAD: Normocephalic; atraumatic.  EYES: no conjunctival injection. pupils dilated 5mm bilaterally   ENT: +LMA   NECK: Supple   CARD: +tachycardic @103   RESP: No wheezes, rales or rhonchi.  ABD: soft nondistended   EXT: No LE edema   NEURO: +unresponsive post cardiac arrest

## 2022-05-16 NOTE — PROCEDURE NOTE - NSPERFORMEDBY_GEN_A_CORE
Myself 32yo M with rectal cancer mets to liver and lung, presenting with acute liver failure, leukocytosis.  - No acute surgical oncology intervention/evaluation at this time  - Recommend medical oncology consult (Dr. Aby Billingsley)  - D/w Dr. Robin Hernandez  [id number]

## 2022-05-16 NOTE — DIETITIAN INITIAL EVALUATION ADULT - NSFNSGIIOFT_GEN_A_CORE
-  I&O's Detail    15 May 2022 07:01  -  16 May 2022 07:00  --------------------------------------------------------  IN:    dextrose 5%: 500 mL    FentaNYL: 4 mL    IV PiggyBack: 800 mL    Lactated Ringers: 525 mL    Lactated Ringers Bolus: 2000 mL    Norepinephrine: 444.9 mL    Sodium Chloride 0.9% Bolus: 1000 mL    sodium chloride 3%: 180 mL  Total IN: 5453.9 mL    OUT:    Indwelling Catheter - Urethral (mL): 69395 mL  Total OUT: 66559 mL    Total NET: -4896.1 mL

## 2022-05-16 NOTE — CHART NOTE - NSCHARTNOTEFT_GEN_A_CORE
Spoke with patient's brothers (Varghese and Juan), and sister (Lynne) at bedside. Updated family regarding sequence of events and findings since patient's arrival, current management and plan of care, and critical nature of patient's current situation. Family understanding.     Contact Info:   Varghese: [phone]  Juan: [phone]  Lynne: [phone] Spoke with patient's brothers (Juan and Varghese), and sister (Lynne) at bedside. Updated family regarding sequence of events and findings since patient's arrival, current management and plan of care, and critical nature of patient's current situation. Family understanding.     Contact Info:   Juan: [phone]  Varghese: [phone]  Lynne: [phone]    Please contact Juan with any further updates.

## 2022-05-16 NOTE — PROGRESS NOTE ADULT - ASSESSMENT
IMPRESSION:    Acute Hypoxemic Respiratory Failure on 30%  SP CPA   Anoxic brain injury   aspiration pneumonia  DI?  Elevated ICP   Lactic Acidosis, downtrending   GRACE   Aspiration pneumonia   Probable Drug Use    PLAN:    CNS: FU MS.  Monitor off sedation.  Precedex if needed, CTH reviewed EEG / VEEG.  Urine and serum drug toxicology.  Neuro critical eval NeuroSX .  NSE.  TTM.    HEENT: Oral care    PULMONARY:  HOB @ 45 degrees.  Aspiration precautions. Keep Sao2 92 to 96%    CARDIOVASCULAR: ivf    GI: GI prophylaxis.  OGT in place, feeding    RENAL:  Follow up lytes.  Correct as needed.    INFECTIOUS DISEASE: organ transplant  HEMATOLOGICAL:  DVT prophylaxis.      ENDOCRINE:  Follow up FS.  Insulin protocol if needed.      MUSCULOSKELETAL: bedrest     MICU monitoring     very poor prognosis prognosis     organ donation eval

## 2022-05-16 NOTE — DIETITIAN INITIAL EVALUATION ADULT - PERTINENT MEDS FT
MEDICATIONS  (STANDING):  ampicillin/sulbactam  IVPB      dextrose 5% (100 mL/Hr)  lactated ringers Bolus 1000 milliLiter(s) IV Bolus once  norepinephrine Infusion 0.05 MICROgram(s)/kG/Min (7.5 mL/Hr) IV Continuous <Continuous>  -

## 2022-05-16 NOTE — PROGRESS NOTE ADULT - SUBJECTIVE AND OBJECTIVE BOX
----------Daily Progress Note----------    HISTORY OF PRESENT ILLNESS:  Patient is a 32y old Female who presents with a chief complaint of cardiac arrest (16 May 2022 08:34)    Currently admitted to medicine with the primary diagnosis of Cardiac arrest       Today is hospital day 1d.     INTERVAL HOSPITAL COURSE / OVERNIGHT EVENTS:    Patient was examined and seen at bedside. This morning she is resting comfortably in bed and reports no new issues or overnight events.     Review of Systems: Otherwise unremarkable     <<<<<PAST MEDICAL & SURGICAL HISTORY>>>>>  Fibromyalgia    Anxiety and depression      ALLERGIES  azithromycin (Unknown)      Home Medications:        MEDICATIONS  STANDING MEDICATIONS  ampicillin/sulbactam  IVPB      ampicillin/sulbactam  IVPB 1.5 Gram(s) IV Intermittent every 6 hours  dextrose 5%. 1000 milliLiter(s) IV Continuous <Continuous>  lactated ringers Bolus 1000 milliLiter(s) IV Bolus once  norepinephrine Infusion 0.05 MICROgram(s)/kG/Min IV Continuous <Continuous>    PRN MEDICATIONS    VITALS:  T(F): 95.7  HR: 102  BP: 93/67  RR: 28  SpO2: 100%    <<<<<LABS>>>>>                        15.1   31.49 )-----------( 243      ( 16 May 2022 05:00 )             45.3     05-16    169<HH>  |  141<H>  |  18  ----------------------------<  232<H>  3.7   |  18  |  1.1    Ca    10.2<H>      16 May 2022 05:00  Mg     2.6     05-16    TPro  6.2  /  Alb  3.6  /  TBili  0.2  /  DBili  x   /  AST  115<H>  /  ALT  104<H>  /  AlkPhos  81  05-16        ABG - ( 16 May 2022 03:18 )  pH, Arterial: 7.40  pH, Blood: x     /  pCO2: 29    /  pO2: 152   / HCO3: 18    / Base Excess: -5.5  /  SaO2: 98.8              Troponin T, Serum: 0.02 ng/mL *H* (05-15-22 @ 19:56)  Lactate, Blood: 2.5 mmol/L *H* (05-15-22 @ 19:56)    [id number]  CARDIAC MARKERS ( 15 May 2022 19:56 )  x     / 0.02 ng/mL / x     / x     / x      CARDIAC MARKERS ( 15 May 2022 05:59 )  x     / <0.01 ng/mL / x     / x     / x            <<<<<RADIOLOGY>>>>>    <<<<<PHYSICAL EXAM>>>>>  GENERAL: Well developed, well nourished and in no acute distress. Resting comfortably in bed.  HEENT: Normocephalic, atraumatic, mucous membranes moist, EOMI, PERRLA, bilateral sclera anicteric, no conjunctival injection  Neck: Supple, non-tender, no lymphadenopathy.  PULMONARY: Clear to auscultation bilaterally. No rales, rhonchi, or wheezing.  CARDIOVASCULAR: Regular rate and rhythm, S1-S2, no murmurs  GASTROINTESTINAL: Soft, non-tender, non-distended, no guarding.  RENAL: No CVA tenderness.  SKIN/EXTREMITIES: No clubbing or edema  NEUROLOGIC/MUSCULOSKELETAL: AOx4, grossly moving all extremities, no focal deficits.      ----------------------------------------------------------------------------------------------------------------------------------------------------------------------------------------------- ----------Daily Progress Note----------    HISTORY OF PRESENT ILLNESS:  33 y/o F unknown PMHx presents to ED in ROSC s/p cardiac arrest. Per EMS pt was w/ her friend and developed agonal breathing and went into asystole per EMS. LMA placed by EMS, given epi x3 and calcium x1, and 4 of intranasal narcan by family. ROSC was obtained after ~ 20 minutes.  In ED patient was intubated and started on levophed. Lactate 11.3 -> 2.6.    Currently admitted to medicine with the primary diagnosis of Cardiac arrest       Today is hospital day 1d.     INTERVAL HOSPITAL COURSE / OVERNIGHT EVENTS:    No overnight events    Review of Systems: Otherwise unremarkable     <<<<<PAST MEDICAL & SURGICAL HISTORY>>>>>  Fibromyalgia    Anxiety and depression      ALLERGIES  azithromycin (Unknown)      Home Medications:        MEDICATIONS  STANDING MEDICATIONS  ampicillin/sulbactam  IVPB      ampicillin/sulbactam  IVPB 1.5 Gram(s) IV Intermittent every 6 hours  dextrose 5%. 1000 milliLiter(s) IV Continuous <Continuous>  lactated ringers Bolus 1000 milliLiter(s) IV Bolus once  norepinephrine Infusion 0.05 MICROgram(s)/kG/Min IV Continuous <Continuous>    PRN MEDICATIONS    VITALS:  T(F): 95.7  HR: 102  BP: 93/67  RR: 28  SpO2: 100%    <<<<<LABS>>>>>                        15.1   31.49 )-----------( 243      ( 16 May 2022 05:00 )             45.3     05-16    169<HH>  |  141<H>  |  18  ----------------------------<  232<H>  3.7   |  18  |  1.1    Ca    10.2<H>      16 May 2022 05:00  Mg     2.6     05-16    TPro  6.2  /  Alb  3.6  /  TBili  0.2  /  DBili  x   /  AST  115<H>  /  ALT  104<H>  /  AlkPhos  81  05-16        ABG - ( 16 May 2022 03:18 )  pH, Arterial: 7.40  pH, Blood: x     /  pCO2: 29    /  pO2: 152   / HCO3: 18    / Base Excess: -5.5  /  SaO2: 98.8              Troponin T, Serum: 0.02 ng/mL *H* (05-15-22 @ 19:56)  Lactate, Blood: 2.5 mmol/L *H* (05-15-22 @ 19:56)    [id number]  CARDIAC MARKERS ( 15 May 2022 19:56 )  x     / 0.02 ng/mL / x     / x     / x      CARDIAC MARKERS ( 15 May 2022 05:59 )  x     / <0.01 ng/mL / x     / x     / x            <<<<<RADIOLOGY>>>>>    <<<<<PHYSICAL EXAM>>>>>  GENERAL: intubated, ill-appearing  PULMONARY: Clear to auscultation bilaterally. No rales, rhonchi, or wheezing.  CARDIOVASCULAR: Regular rate and rhythm, S1-S2, no murmurs  GASTROINTESTINAL: Soft, non-tender, non-distended, no guarding.  NEUROLOGIC: AAOX0, nonresponsive to commands      ----------------------------------------------------------------------------------------------------------------------------------------------------------------------------------------------- ----------Daily Progress Note----------    HISTORY OF PRESENT ILLNESS:  33 y/o F unknown PMHx presents to ED in ROSC s/p cardiac arrest. Per EMS pt was w/ her friend and developed agonal breathing and went into asystole per EMS. LMA placed by EMS, given epi x3 and calcium x1, and 4 of intranasal narcan by family. ROSC was obtained after ~ 20 minutes.  In ED patient was intubated and started on levophed. Lactate 11.3 -> 2.6.    Currently admitted to medicine with the primary diagnosis of Cardiac arrest       Today is hospital day 1d.     INTERVAL HOSPITAL COURSE / OVERNIGHT EVENTS:    A-line placed overnight    Review of Systems: Otherwise unremarkable     <<<<<PAST MEDICAL & SURGICAL HISTORY>>>>>  Fibromyalgia    Anxiety and depression      ALLERGIES  azithromycin (Unknown)      Home Medications:        MEDICATIONS  STANDING MEDICATIONS  ampicillin/sulbactam  IVPB      ampicillin/sulbactam  IVPB 1.5 Gram(s) IV Intermittent every 6 hours  dextrose 5%. 1000 milliLiter(s) IV Continuous <Continuous>  lactated ringers Bolus 1000 milliLiter(s) IV Bolus once  norepinephrine Infusion 0.05 MICROgram(s)/kG/Min IV Continuous <Continuous>    PRN MEDICATIONS    VITALS:  T(F): 95.7  HR: 102  BP: 93/67  RR: 28  SpO2: 100%    <<<<<LABS>>>>>                        15.1   31.49 )-----------( 243      ( 16 May 2022 05:00 )             45.3     05-16    169<HH>  |  141<H>  |  18  ----------------------------<  232<H>  3.7   |  18  |  1.1    Ca    10.2<H>      16 May 2022 05:00  Mg     2.6     05-16    TPro  6.2  /  Alb  3.6  /  TBili  0.2  /  DBili  x   /  AST  115<H>  /  ALT  104<H>  /  AlkPhos  81  05-16        ABG - ( 16 May 2022 03:18 )  pH, Arterial: 7.40  pH, Blood: x     /  pCO2: 29    /  pO2: 152   / HCO3: 18    / Base Excess: -5.5  /  SaO2: 98.8              Troponin T, Serum: 0.02 ng/mL *H* (05-15-22 @ 19:56)  Lactate, Blood: 2.5 mmol/L *H* (05-15-22 @ 19:56)    [id number]  CARDIAC MARKERS ( 15 May 2022 19:56 )  x     / 0.02 ng/mL / x     / x     / x      CARDIAC MARKERS ( 15 May 2022 05:59 )  x     / <0.01 ng/mL / x     / x     / x            <<<<<RADIOLOGY>>>>>    < from: CT Head No Cont (05.15.22 @ 11:50) >  PROCEDURE DATE:  05/15/2022          INTERPRETATION:  CLINICAL INDICATION: ROSC s/p 20 minutes of cardiac   arrest.    TECHNIQUE: Axial CT scanning of the brain was obtained from the skull   base to the vertex without the administration of intravenous contrast.   Reformatted coronal and sagittal images were subsequently obtained and   reviewed.    COMPARISON: None    FINDINGS:  Diffuse loss of gray-white matter differentiation as well as diffuse   cerebral edema. There is effacement of the basal cisterns as well as   downward central herniation and bilateral uncal herniation. There is   complete effacement of the third ventricle with near complete effacement   of the fourth ventricle.    No acute intracranial hemorrhage. No extra-axial collection.    The visualized paranasal sinuses and mastoid air cells are clear.    The calvarium is intact.    IMPRESSION:  Diffuse loss of gray-white matter differentiation as well as diffuse   cerebral edema. There is effacement of the basal cisterns as well as   downward central herniation and bilateral uncal herniation. There is   complete effacement of the third ventricle with near complete effacement   of the fourth ventricle. Findings represent diffuse anoxic brain injury   and increased intracranial pressure.    < end of copied text >      <<<<<PHYSICAL EXAM>>>>>  GENERAL: intubated, ill-appearing  PULMONARY: Clear to auscultation bilaterally. No rales, rhonchi, or wheezing.  CARDIOVASCULAR: Regular rate and rhythm, S1-S2, no murmurs  GASTROINTESTINAL: Soft, non-tender, non-distended, no guarding.  NEUROLOGIC: AAOX0, nonresponsive to commands      -----------------------------------------------------------------------------------------------------------------------------------------------------------------------------------------------

## 2022-05-16 NOTE — PROGRESS NOTE ADULT - SUBJECTIVE AND OBJECTIVE BOX
Over Night Events: events noted, still intubated, ventilated, levophed 0.18, off fentanyl    PHYSICAL EXAM    ICU Vital Signs Last 24 Hrs  T(C): 35.4 (16 May 2022 08:00), Max: 36.2 (16 May 2022 04:00)  T(F): 95.7 (16 May 2022 08:00), Max: 97.1 (16 May 2022 04:00)  HR: 102 (16 May 2022 08:30) (80 - 121)  BP: 93/67 (16 May 2022 04:30) (85/56 - 141/99)  BP(mean): 74 (16 May 2022 04:30) (66 - 119)  ABP: 106/76 (16 May 2022 08:30) (82/70 - 154/106)  ABP(mean): 88 (16 May 2022 08:30) (76 - 128)  RR: 28 (16 May 2022 08:30) (28 - 28)  SpO2: 100% (16 May 2022 08:30) (100% - 100%)      General: ill looking  HEENT: ETT           Lungs: Bilateral BS  Cardiovascular: Regular   Abdomen: Soft, Positive BS  Extremities: No clubbing   no brainstem activity    05-15-22 @ 07:01  -  05-16-22 @ 07:00  --------------------------------------------------------  IN:    dextrose 5%: 500 mL    FentaNYL: 4 mL    IV PiggyBack: 800 mL    Lactated Ringers: 525 mL    Lactated Ringers Bolus: 2000 mL    Norepinephrine: 444.9 mL    Sodium Chloride 0.9% Bolus: 1000 mL    sodium chloride 3%: 180 mL  Total IN: 5453.9 mL    OUT:    Indwelling Catheter - Urethral (mL): 44317 mL  Total OUT: 31566 mL    Total NET: -4896.1 mL      05-16-22 @ 07:01  -  05-16-22 @ 08:35  --------------------------------------------------------  IN:    dextrose 5%: 100 mL    Norepinephrine: 25.5 mL  Total IN: 125.5 mL    OUT:    Dexmedetomidine: 0 mL    FentaNYL: 0 mL    Indwelling Catheter - Urethral (mL): 350 mL  Total OUT: 350 mL    Total NET: -224.5 mL          LABS:                          15.1   31.49 )-----------( 243      ( 16 May 2022 05:00 )             45.3                                               05-16    169<HH>  |  141<H>  |  18  ----------------------------<  232<H>  3.7   |  18  |  1.1    Ca    10.2<H>      16 May 2022 05:00  Mg     2.6     05-16    TPro  6.2  /  Alb  3.6  /  TBili  0.2  /  DBili  x   /  AST  115<H>  /  ALT  104<H>  /  AlkPhos  81  05-16                                                 CARDIAC MARKERS ( 15 May 2022 19:56 )  x     / 0.02 ng/mL / x     / x     / x      CARDIAC MARKERS ( 15 May 2022 05:59 )  x     / <0.01 ng/mL / x     / x     / x                                                LIVER FUNCTIONS - ( 16 May 2022 05:00 )  Alb: 3.6 g/dL / Pro: 6.2 g/dL / ALK PHOS: 81 U/L / ALT: 104 U/L / AST: 115 U/L / GGT: x                                                                                               Mode: AC/ CMV (Assist Control/ Continuous Mandatory Ventilation)  RR (machine): 28  TV (machine): 450  FiO2: 40  PEEP: 8  ITime: 1  MAP: 14  PIP: 24                                      ABG - ( 16 May 2022 03:18 )  pH, Arterial: 7.40  pH, Blood: x     /  pCO2: 29    /  pO2: 152   / HCO3: 18    / Base Excess: -5.5  /  SaO2: 98.8      LA 1.9          MEDICATIONS  (STANDING):  ampicillin/sulbactam  IVPB      ampicillin/sulbactam  IVPB 1.5 Gram(s) IV Intermittent every 6 hours  dexMEDEtomidine Infusion 0.05 MICROgram(s)/kG/Hr (1 mL/Hr) IV Continuous <Continuous>  dextrose 5%. 1000 milliLiter(s) (100 mL/Hr) IV Continuous <Continuous>  fentaNYL   Infusion... 0.5 MICROgram(s)/kG/Hr (2 mL/Hr) IV Continuous <Continuous>  lactated ringers Bolus 1000 milliLiter(s) IV Bolus once  norepinephrine Infusion 0.05 MICROgram(s)/kG/Min (7.5 mL/Hr) IV Continuous <Continuous>    CXR reviewed

## 2022-05-16 NOTE — CONSULT NOTE ADULT - PROVIDER SPECIALTY LIST ADULT
"[facility] does not have a PEDIATRIC EMERGENCY ROOM, PEDIATRIC UNIT OR  PEDIATRIC INTENSIVE CARE UNIT.       "Your feedback is important to us. If you should receive a survey in the next few days, please share your experience with us."     "
NSICU
Neurosurgery
Critical Care
NSICU
Cardiology

## 2022-05-16 NOTE — PROGRESS NOTE ADULT - ASSESSMENT
31 y/o F unknown PMHx presents to ED in ROSC s/p cardiac arrest    Acute hypoxemic respiratory failure secondary to suspected drug overdose  S/p cardiac arrest  HAGMA secondary to lactic acidosis  -as per EMS, pt was with friend, developed agonal breathing and went into asystole per EMS; ACLS initiated and ROSC achieved after 20 minutes  -pt intubated in ED, started on levophed  -put on sedation during intubated, however currently off sedation  -HAGMA improving ,lactate 11.30---->1.9  - CTH showing downward central herniation and b/l uncal herniation  -neurosurgery consulted for CTH head findings; no surgical intervention, NSE sent  -cardio consulted for cardiac arrest; will f/u toxicology screening           33 y/o F unknown PMHx presents to ED in ROSC s/p cardiac arrest    Acute hypoxemic respiratory failure secondary to suspected drug overdose s/p cardiac arrest   HAGMA secondary to lactic acidosis  Aspiration pna  -as per EMS, pt was with friend, developed agonal breathing and went into asystole per EMS; ACLS initiated and ROSC achieved after 20 minutes  -pt intubated in ED, started on levophed  -put on sedation during intubated, however currently off sedation  -HAGMA improving ,lactate 11.30---->1.9  - CTH showing downward central herniation and b/l uncal herniation  -neurosurgery consulted for CTH head findings; no surgical intervention, NSE sent  -cardio consulted for cardiac arrest; will f/u toxicology screening  -f/u vEEG  -continue unasyn 1.5 q6 for asp pna; started 5/15  -neurocrit following, will await final recs regarding when to initiate brain death protocol  -next of kin is Juan Alfaro pts brother ([phone]); can reach out to him regarding updates    Hypernatremia  -Na on admission 136---->169  -serum osm 354  -continue D5 infusion 100cc/hr  -repeat BMP q6      DVT ppx: none  GI ppx: none    Very poor prognosis     31 y/o F unknown PMHx presents to ED in ROSC s/p cardiac arrest    Acute hypoxemic respiratory failure secondary to suspected drug overdose s/p cardiac arrest   HAGMA secondary to lactic acidosis  Aspiration pna  -as per EMS, pt was with friend, developed agonal breathing and went into asystole per EMS; ACLS initiated and ROSC achieved after 20 minutes  -pt intubated in ED, started on levophed  -put on sedation during intubated, however currently off sedation  -HAGMA improving ,lactate 11.30---->1.9  - CTH showing downward central herniation and b/l uncal herniation  -neurosurgery consulted for CTH head findings; no surgical intervention, NSE sent  -cardio consulted for cardiac arrest; will f/u toxicology screening  -f/u vEEG  -continue unasyn 1.5 q6 for asp pna; started 5/15  -neurocrit following, will await final recs regarding when to initiate brain death protocol  -next of kin is Juan Alfaro pts brother ([phone]); can reach out to him regarding updates    Hypernatremia secondary to possible CDI  -Na on admission 136---->169  -serum osm 354  -continue D5 infusion 100cc/hr  -repeat BMP q6  -net output of -5.4L since admission on 5/15/22; will give DDAVP 0.25 mcg q24 and reassess tomorrow morning      DVT ppx: none  GI ppx: none    Very poor prognosis

## 2022-05-16 NOTE — EEG REPORT - NS EEG TEXT BOX
Epilepsy Attending Note:     FREDDY CHAMBERS    32y Female  MRN [id number]    Vital Signs Last 24 Hrs  T(C): 35.4 (16 May 2022 08:00), Max: 36.2 (16 May 2022 04:00)  T(F): 95.7 (16 May 2022 08:00), Max: 97.1 (16 May 2022 04:00)  HR: 100 (16 May 2022 10:45) (80 - 110)  BP: 93/67 (16 May 2022 04:30) (85/56 - 141/99)  BP(mean): 74 (16 May 2022 04:30) (66 - 119)  RR: 28 (16 May 2022 10:45) (27 - 28)  SpO2: 100% (16 May 2022 10:45) (100% - 100%)                          15.1   31.49 )-----------( 243      ( 16 May 2022 05:00 )             45.3       05-16    169<HH>  |  141<H>  |  18  ----------------------------<  232<H>  3.7   |  18  |  1.1    Ca    10.2<H>      16 May 2022 05:00  Mg     2.6     05-16    TPro  6.2  /  Alb  3.6  /  TBili  0.2  /  DBili  x   /  AST  115<H>  /  ALT  104<H>  /  AlkPhos  81  05-16      MEDICATIONS  (STANDING):  ampicillin/sulbactam  IVPB      ampicillin/sulbactam  IVPB 1.5 Gram(s) IV Intermittent every 6 hours  dextrose 5%. 1000 milliLiter(s) (100 mL/Hr) IV Continuous <Continuous>  lactated ringers Bolus 1000 milliLiter(s) IV Bolus once  norepinephrine Infusion 0.05 MICROgram(s)/kG/Min (7.5 mL/Hr) IV Continuous <Continuous>    MEDICATIONS  (PRN):            VEEG in the last 24 hours:    Background-----------  very low amplitude  PDR reaching 2-3 hz  superimposed by large amount of low amplitude fast activity. The BG shows no reactivity    Focal and generalized slowing-------------severe generalized slowing    Interictal activity-------- none    Events-----------  none    Seizures----------  none    Impression:  abnormal as above    Plan - as/NCC team

## 2022-05-16 NOTE — CONSULT NOTE ADULT - ASSESSMENT
ASSESSMENT: 32-year-old s/p cardiac arrest, ROSC after 20 minutes, no TTM, intubated for airway protection.     RECOMMENDATIONS:   - Continue Video EEG  - Maintain normothermia; avoid fevers  - Stat urine osmo/BMP/urine lytes/UA and ABG fo evaluate for DI  - Increase D5W to 150cc/hr for correct Serum Na   - Maintain Potassium > 4, Magnesium > 2, and Phosphorous > 3  - DDAVP 0.5 mcg SQ x1  - Given current neurological examination, prognosis is poor; however, cannot declare neuroprognostication at this time 2/2 electrolyte abnormalities       Kathleen Garnett, [facility]  [phone]             ASSESSMENT: 32-year-old s/p cardiac arrest, ROSC after 20 minutes, no TTM, intubated for airway protection.     RECOMMENDATIONS:   - Continue Video EEG  - Maintain temperature 35C; avoid fevers  - Stat urine osmo/BMP/urine lytes/UA and ABG fo evaluate for DI  - Increase D5W to 150cc/hr for correct Serum Na   - Serum Na q4hrs  - Maintain Potassium > 4, Magnesium > 2, and Phosphorous > 3  - DDAVP 0.5 mcg SQ x1  - Given current neurological examination, prognosis is poor; however, cannot declare neuroprognostication at this time 2/2 electrolyte abnormalities       Kathleen Garnett, [facility]  [phone]

## 2022-05-16 NOTE — DIETITIAN INITIAL EVALUATION ADULT - ENTERAL
Glucerna 1.2, maintain at trickle feeds 10-20ml/hr for 24hrs. Can advance feeds if consistent with GOC and if MICU on board- titrate by 20ml Q4h to goal rate 50ml/hr + No Carb Prosource (1pkg = 15gms Protein) 2x/day.

## 2022-05-16 NOTE — DIETITIAN INITIAL EVALUATION ADULT - PERTINENT LABORATORY DATA
05-16    174<HH>  |  147<H>  |  18  ----------------------------<  254<H>  3.3<L>   |  15<L>  |  1.2    Ca    10.3<H>      16 May 2022 11:38  Mg     2.6     05-16    TPro  6.2  /  Alb  3.6  /  TBili  0.2  /  DBili  x   /  AST  115<H>  /  ALT  104<H>  /  AlkPhos  81  05-16  -

## 2022-05-16 NOTE — CONSULT NOTE ADULT - SUBJECTIVE AND OBJECTIVE BOX
SUMMARY: HPI:  33 y/o F unknown PMHx presents to ED in ROSC s/p cardiac arrest. Per EMS pt was w/ her friend and developed agonal breathing and went into asystole per EMS. LMA placed by EMS, given epi x3 and calcium x1, and 4 of intranasal narcan by family. ROSC was obtained after ~ 20 minutes.  In ED patient was intubated and started on levophed. Lactate 11.3 -> 2.6.  Tried calling numbers in chart with no response.  (15 May 2022 11:32)    NCC consulted 2/2 neuro prognostication s/p cardiac arrest    REVIEW OF SYSTEMS: Patient unable to participate in ROS due to neurologic status.     VITALS: [X] Reviewed    IMAGING/DATA: [X] Reviewed    IVF FLUIDS/MEDICATIONS: [X] Reviewed    ALLERGIES: Allergies    azithromycin (Unknown)    Intolerances      EXAMINATION:  General: No acute distress  HEENT: Anicteric sclerae  Cardiac: [id number]  Lungs: Clear  Abdomen: Soft, non-tender, +BS  Extremities: No c/c/e  Skin/Incision Site: Clean, dry and intact  Neurologic: mechanically vented, no sedation      ICU Vital Signs Last 24 Hrs  T(C): 35.2 (16 May 2022 12:00), Max: 36.2 (16 May 2022 04:00)  T(F): 95.4 (16 May 2022 12:00), Max: 97.1 (16 May 2022 04:00)  HR: 98 (16 May 2022 15:15) (80 - 106)  BP: 93/67 (16 May 2022 04:30) (85/56 - 125/90)  BP(mean): 74 (16 May 2022 04:30) (66 - 105)  ABP: 106/72 (16 May 2022 15:15) (82/70 - 140/98)  ABP(mean): 86 (16 May 2022 15:15) (76 - 116)  RR: 28 (16 May 2022 15:15) (27 - 28)  SpO2: 100% (16 May 2022 15:15) (100% - 100%)      05-15-22 @ 07:01  -  05-16-22 @ 07:00  --------------------------------------------------------  IN: 5453.9 mL / OUT: 31387 mL / NET: -4896.1 mL    05-16-22 @ 07:01  -  05-16-22 @ 15:27  --------------------------------------------------------  IN: 1078.5 mL / OUT: 1200 mL / NET: -121.5 mL        Mode: AC/ CMV (Assist Control/ Continuous Mandatory Ventilation), RR (machine): 28, TV (machine): 450, FiO2: 30, PEEP: 5, ITime: 1, MAP: 14, PIP: 25    ampicillin/sulbactam  IVPB      ampicillin/sulbactam  IVPB 1.5 Gram(s) IV Intermittent every 6 hours  chlorhexidine 0.12% Liquid 15 milliLiter(s) Oral Mucosa every 12 hours  chlorhexidine 4% Liquid 1 Application(s) Topical <User Schedule>  desmopressin Injectable 0.5 MICROGram(s) SubCutaneous once  dextrose 5%. 1000 milliLiter(s) (125 mL/Hr) IV Continuous <Continuous>  lactated ringers Bolus 1000 milliLiter(s) IV Bolus once  norepinephrine Infusion 0.05 MICROgram(s)/kG/Min (7.5 mL/Hr) IV Continuous <Continuous>      LABS:  Na: 174 (05-16 @ 11:38), 169 (05-16 @ 05:00), 168 (05-16 @ 02:35), 160 (05-15 @ 23:09), 150 (05-15 @ 19:56), 136 (05-15 @ 05:59)  K: 3.3 (05-16 @ 11:38), 3.7 (05-16 @ 05:00), 3.5 (05-16 @ 02:35), 2.8 (05-15 @ 23:09), 2.0 (05-15 @ 19:56), 4.3 (05-15 @ 05:59)  Cl: 147 (05-16 @ 11:38), 141 (05-16 @ 05:00), 140 (05-16 @ 02:35), 131 (05-15 @ 23:09), 120 (05-15 @ 19:56), 100 (05-15 @ 05:59)  CO2: 15 (05-16 @ 11:38), 18 (05-16 @ 05:00), 19 (05-16 @ 02:35), 16 (05-15 @ 23:09), 17 (05-15 @ 19:56), 12 (05-15 @ 05:59)  BUN: 18 (05-16 @ 11:38), 18 (05-16 @ 05:00), 18 (05-16 @ 02:35), 17 (05-15 @ 23:09), 17 (05-15 @ 19:56), 9 (05-15 @ 05:59)  Cr: 1.2 (05-16 @ 11:38), 1.1 (05-16 @ 05:00), 1.3 (05-16 @ 02:35), 1.3 (05-15 @ 23:09), 1.3 (05-15 @ 19:56), 1.3 (05-15 @ 05:59)  Glu: 254(05-16 @ 11:38), 232(05-16 @ 05:00), 227(05-16 @ 02:35), 239(05-15 @ 23:09), 241(05-15 @ 19:56), 437(05-15 @ 05:59)    Hgb: 15.1 (05-16 @ 05:00), 11.9 (05-15 @ 05:59)  Hct: 45.3 (05-16 @ 05:00), 38.7 (05-15 @ 05:59)  WBC: 31.49 (05-16 @ 05:00), 13.79 (05-15 @ 05:59)  Plt: 243 (05-16 @ 05:00), 286 (05-15 @ 05:59)    INR:   PTT:       LIVER FUNCTIONS - ( 16 May 2022 05:00 )  Alb: 3.6 g/dL / Pro: 6.2 g/dL / ALK PHOS: 81 U/L / ALT: 104 U/L / AST: 115 U/L / GGT: x           ABG - ( 16 May 2022 03:18 )  pH, Arterial: 7.40  pH, Blood: x     /  pCO2: 29    /  pO2: 152   / HCO3: 18    / Base Excess: -5.5  /  SaO2: 98.8          Imaging:  EXAM:  CT BRAIN [5/15/2022]  FINDINGS:  Diffuse loss of gray-white matter differentiation as well as diffuse cerebral edema. There is effacement of the basal cisterns as well as downward central herniation and bilateral uncal herniation. There is complete effacement of the third ventricle with near complete effacement of the fourth ventricle.  No acute intracranial hemorrhage. No extra-axial collection.  The visualized paranasal sinuses and mastoid air cells are clear.  The calvarium is intact.  IMPRESSION:  Diffuse loss of gray-white matter differentiation as well as diffuse cerebral edema. There is effacement of the basal cisterns as well as downward central herniation and bilateral uncal herniation. There is complete effacement of the third ventricle with near complete effacement of the fourth ventricle. Findings represent diffuse anoxic brain injury and increased intracranial pressure.    VEEG in the last 24 hours: [5/16/2022]  Background-----------  very low amplitude  PDR reaching 2-3 hz  superimposed by large amount of low amplitude fast activity. The BG shows no reactivity  Focal and generalized slowing-------------severe generalized slowing  Interictal activity-------- none  Events-----------  none  Seizures----------  none  Impression:  abnormal as above       SUMMARY: HPI:  31 y/o F unknown PMHx presents to ED in ROSC s/p cardiac arrest. Per EMS pt was w/ her friend and developed agonal breathing and went into asystole per EMS. LMA placed by EMS, given epi x3 and calcium x1, and 4 of intranasal narcan by family. ROSC was obtained after ~ 20 minutes.  In ED patient was intubated and started on levophed. Lactate 11.3 -> 2.6.  Tried calling numbers in chart with no response.  (15 May 2022 11:32)    NCC consulted 2/2 neuro prognostication s/p cardiac arrest    REVIEW OF SYSTEMS: Patient unable to participate in ROS due to neurologic status.     VITALS: [X] Reviewed    IMAGING/DATA: [X] Reviewed    IVF FLUIDS/MEDICATIONS: [X] Reviewed    ALLERGIES: Allergies    azithromycin (Unknown)    Intolerances      EXAMINATION:  General: No acute distress  HEENT: Anicteric sclerae  Cardiac: [id number]  Lungs: Clear  Abdomen: Soft, non-tender, +BS  Extremities: No c/c/e  Skin/Incision Site: Clean, dry and intact  Neurologic: mechanically vented, no sedation, does not open eyes to voice or noxious stimuli, does not track to voice, does not follow simple or complex commands, Pupils 6mm fixed bilaterally. (-) blink to threat bilaterally. No gaze preference or nystagmus noted. (-) corneal reflex bilaterally (-) oculocephalic reflex (-) cough reflex (-) gag reflex. Face grossly symmetrical with no loss of nasolabial folds. Normal bulk and tone. No movement to noxious stimuli bilaterally throughout.       ICU Vital Signs Last 24 Hrs  T(C): 35.2 (16 May 2022 12:00), Max: 36.2 (16 May 2022 04:00)  T(F): 95.4 (16 May 2022 12:00), Max: 97.1 (16 May 2022 04:00)  HR: 98 (16 May 2022 15:15) (80 - 106)  BP: 93/67 (16 May 2022 04:30) (85/56 - 125/90)  BP(mean): 74 (16 May 2022 04:30) (66 - 105)  ABP: 106/72 (16 May 2022 15:15) (82/70 - 140/98)  ABP(mean): 86 (16 May 2022 15:15) (76 - 116)  RR: 28 (16 May 2022 15:15) (27 - 28)  SpO2: 100% (16 May 2022 15:15) (100% - 100%)      05-15-22 @ 07:01  -  05-16-22 @ 07:00  --------------------------------------------------------  IN: 5453.9 mL / OUT: 63515 mL / NET: -4896.1 mL    05-16-22 @ 07:01  -  05-16-22 @ 15:27  --------------------------------------------------------  IN: 1078.5 mL / OUT: 1200 mL / NET: -121.5 mL        Mode: AC/ CMV (Assist Control/ Continuous Mandatory Ventilation), RR (machine): 28, TV (machine): 450, FiO2: 30, PEEP: 5, ITime: 1, MAP: 14, PIP: 25    ampicillin/sulbactam  IVPB      ampicillin/sulbactam  IVPB 1.5 Gram(s) IV Intermittent every 6 hours  chlorhexidine 0.12% Liquid 15 milliLiter(s) Oral Mucosa every 12 hours  chlorhexidine 4% Liquid 1 Application(s) Topical <User Schedule>  desmopressin Injectable 0.5 MICROGram(s) SubCutaneous once  dextrose 5%. 1000 milliLiter(s) (125 mL/Hr) IV Continuous <Continuous>  lactated ringers Bolus 1000 milliLiter(s) IV Bolus once  norepinephrine Infusion 0.05 MICROgram(s)/kG/Min (7.5 mL/Hr) IV Continuous <Continuous>      LABS:  Na: 174 (05-16 @ 11:38), 169 (05-16 @ 05:00), 168 (05-16 @ 02:35), 160 (05-15 @ 23:09), 150 (05-15 @ 19:56), 136 (05-15 @ 05:59)  K: 3.3 (05-16 @ 11:38), 3.7 (05-16 @ 05:00), 3.5 (05-16 @ 02:35), 2.8 (05-15 @ 23:09), 2.0 (05-15 @ 19:56), 4.3 (05-15 @ 05:59)  Cl: 147 (05-16 @ 11:38), 141 (05-16 @ 05:00), 140 (05-16 @ 02:35), 131 (05-15 @ 23:09), 120 (05-15 @ 19:56), 100 (05-15 @ 05:59)  CO2: 15 (05-16 @ 11:38), 18 (05-16 @ 05:00), 19 (05-16 @ 02:35), 16 (05-15 @ 23:09), 17 (05-15 @ 19:56), 12 (05-15 @ 05:59)  BUN: 18 (05-16 @ 11:38), 18 (05-16 @ 05:00), 18 (05-16 @ 02:35), 17 (05-15 @ 23:09), 17 (05-15 @ 19:56), 9 (05-15 @ 05:59)  Cr: 1.2 (05-16 @ 11:38), 1.1 (05-16 @ 05:00), 1.3 (05-16 @ 02:35), 1.3 (05-15 @ 23:09), 1.3 (05-15 @ 19:56), 1.3 (05-15 @ 05:59)  Glu: 254(05-16 @ 11:38), 232(05-16 @ 05:00), 227(05-16 @ 02:35), 239(05-15 @ 23:09), 241(05-15 @ 19:56), 437(05-15 @ 05:59)    Hgb: 15.1 (05-16 @ 05:00), 11.9 (05-15 @ 05:59)  Hct: 45.3 (05-16 @ 05:00), 38.7 (05-15 @ 05:59)  WBC: 31.49 (05-16 @ 05:00), 13.79 (05-15 @ 05:59)  Plt: 243 (05-16 @ 05:00), 286 (05-15 @ 05:59)    INR:   PTT:       LIVER FUNCTIONS - ( 16 May 2022 05:00 )  Alb: 3.6 g/dL / Pro: 6.2 g/dL / ALK PHOS: 81 U/L / ALT: 104 U/L / AST: 115 U/L / GGT: x           ABG - ( 16 May 2022 03:18 )  pH, Arterial: 7.40  pH, Blood: x     /  pCO2: 29    /  pO2: 152   / HCO3: 18    / Base Excess: -5.5  /  SaO2: 98.8          Imaging:  EXAM:  CT BRAIN [5/15/2022]  FINDINGS:  Diffuse loss of gray-white matter differentiation as well as diffuse cerebral edema. There is effacement of the basal cisterns as well as downward central herniation and bilateral uncal herniation. There is complete effacement of the third ventricle with near complete effacement of the fourth ventricle.  No acute intracranial hemorrhage. No extra-axial collection.  The visualized paranasal sinuses and mastoid air cells are clear.  The calvarium is intact.  IMPRESSION:  Diffuse loss of gray-white matter differentiation as well as diffuse cerebral edema. There is effacement of the basal cisterns as well as downward central herniation and bilateral uncal herniation. There is complete effacement of the third ventricle with near complete effacement of the fourth ventricle. Findings represent diffuse anoxic brain injury and increased intracranial pressure.    VEEG in the last 24 hours: [5/16/2022]  Background-----------  very low amplitude  PDR reaching 2-3 hz  superimposed by large amount of low amplitude fast activity. The BG shows no reactivity  Focal and generalized slowing-------------severe generalized slowing  Interictal activity-------- none  Events-----------  none  Seizures----------  none  Impression:  abnormal as above

## 2022-05-17 NOTE — EEG REPORT - NS EEG TEXT BOX
Epilepsy Attending Note:     FREDDY CHAMBERS    32y Female  MRN [id number]    Vital Signs Last 24 Hrs  T(C): 33.9 (17 May 2022 08:00), Max: 35.2 (16 May 2022 12:00)  T(F): 93 (17 May 2022 08:00), Max: 95.4 (16 May 2022 12:00)  HR: 72 (17 May 2022 09:15) (72 - 132)  BP: 91/65 (17 May 2022 09:15) (80/57 - 102/74)  BP(mean): 73 (17 May 2022 09:15) (63 - 82)  RR: 22 (17 May 2022 09:15) (22 - 28)  SpO2: 100% (17 May 2022 09:15) (100% - 100%)                          13.1   21.22 )-----------( 157      ( 17 May 2022 04:55 )             38.6       05-17    165<HH>  |  139<H>  |  19  ----------------------------<  190<H>  3.5   |  17  |  0.8    Ca    9.3      17 May 2022 04:55  Mg     2.2     05-17    TPro  5.5<L>  /  Alb  3.0<L>  /  TBili  0.3  /  DBili  x   /  AST  61<H>  /  ALT  79<H>  /  AlkPhos  71  05-17      MEDICATIONS  (STANDING):  ampicillin/sulbactam  IVPB      ampicillin/sulbactam  IVPB 1.5 Gram(s) IV Intermittent every 6 hours  artificial  tears Solution 1 Drop(s) Both EYES every 6 hours  chlorhexidine 0.12% Liquid 15 milliLiter(s) Oral Mucosa every 12 hours  chlorhexidine 4% Liquid 1 Application(s) Topical <User Schedule>  desmopressin Injectable 0.5 MICROGram(s) SubCutaneous <User Schedule>  dextrose 5%. 1000 milliLiter(s) (125 mL/Hr) IV Continuous <Continuous>  lactated ringers Bolus 1000 milliLiter(s) IV Bolus once  norepinephrine Infusion 0.05 MICROgram(s)/kG/Min (7.5 mL/Hr) IV Continuous <Continuous>  pantoprazole   Suspension 40 milliGRAM(s) Oral daily    MEDICATIONS  (PRN):          VEEG in the last 24 hours:    Background - very low amplitude, not reactive. At the sensitivity of 3 mV only showing scattered activity suggestive of cortical origin    Focal and generalized slowing - severe generalized slowing    Interictal activity - none    Events - none    Seizures - none    Impression: Abnormal VEEG as above    Plan - per [facility]CC team

## 2022-05-17 NOTE — PROGRESS NOTE ADULT - SUBJECTIVE AND OBJECTIVE BOX
----------Daily Progress Note----------    HISTORY OF PRESENT ILLNESS:  Patient is a 32y old Female who presents with a chief complaint of cardiac arrest (17 May 2022 08:09)    Currently admitted to medicine with the primary diagnosis of Cardiac arrest       Today is hospital day 2d.     INTERVAL HOSPITAL COURSE / OVERNIGHT EVENTS:    Pt had episode of HTN and tachycardia overnight, given lopressor 5 IV with resolution     Review of Systems: Otherwise unremarkable     <<<<<PAST MEDICAL & SURGICAL HISTORY>>>>>  Fibromyalgia    Anxiety and depression      ALLERGIES  azithromycin (Unknown)      Home Medications:        MEDICATIONS  STANDING MEDICATIONS  ampicillin/sulbactam  IVPB      ampicillin/sulbactam  IVPB 1.5 Gram(s) IV Intermittent every 6 hours  artificial  tears Solution 1 Drop(s) Both EYES every 6 hours  chlorhexidine 0.12% Liquid 15 milliLiter(s) Oral Mucosa every 12 hours  chlorhexidine 4% Liquid 1 Application(s) Topical <User Schedule>  desmopressin Injectable 0.5 MICROGram(s) SubCutaneous <User Schedule>  dextrose 5%. 1000 milliLiter(s) IV Continuous <Continuous>  lactated ringers Bolus 1000 milliLiter(s) IV Bolus once  norepinephrine Infusion 0.05 MICROgram(s)/kG/Min IV Continuous <Continuous>  pantoprazole   Suspension 40 milliGRAM(s) Oral daily    PRN MEDICATIONS    VITALS:  T(F): 93  HR: 72  BP: 91/65  RR: 22  SpO2: 100%    <<<<<LABS>>>>>                        13.1   . )-----------( 157      ( 17 May 2022 04:55 )             38.6     05-    165<HH>  |  139<H>  |  19  ----------------------------<  190<H>  3.5   |  17  |  0.8    Ca    9.3      17 May 2022 04:55  Mg     2.2     -    TPro  5.5<L>  /  Alb  3.0<L>  /  TBili  0.3  /  DBili  x   /  AST  61<H>  /  ALT  79<H>  /  AlkPhos  71  05-      Urinalysis Basic - ( 16 May 2022 22:18 )    Color: Yellow / Appearance: Slightly Turbid / S.024 / pH: x  Gluc: x / Ketone: Negative  / Bili: Negative / Urobili: <2 mg/dL   Blood: x / Protein: 100 mg/dL / Nitrite: Negative   Leuk Esterase: Negative / RBC: 2 /HPF / WBC 22 /HPF   Sq Epi: x / Non Sq Epi: 16 /HPF / Bacteria: Negative      ABG - ( 17 May 2022 03:41 )  pH, Arterial: 7.47  pH, Blood: x     /  pCO2: 25    /  pO2: 171   / HCO3: 18    / Base Excess: -3.6  /  SaO2: 99.1                [id number]  CARDIAC MARKERS ( 15 May 2022 19:56 )  x     / 0.02 ng/mL / x     / x     / x            <<<<<RADIOLOGY>>>>>    < from: Xray Chest 1 View- PORTABLE-Routine (Xray Chest 1 View- PORTABLE-Routine in AM.) (22 @ 05:16) >  PROCEDURE DATE:  2022          INTERPRETATION:  Clinical History / Reason for exam: Intubated    Comparison : Chest radiograph 2022.    Technique/Positioning: Frontal, portable.    Findings:    Support devices: Endotracheal tube is in good position. Tip of the   nasogastric tube is seen overlying the left upper quadrant. Telemetry   leads and tubing overlie patient.    Cardiac/mediastinum/hilum: Stable    Lungparenchyma/Pleura: Within normal limits.    Skeleton/soft tissues: Stable    Impression:    No radiographic evidence of acute cardiopulmonary disease.    < end of copied text >      <<<<<PHYSICAL EXAM>>>>>  GENERAL: intubated, ill-appearing  PULMONARY: Clear to auscultation bilaterally. No rales, rhonchi, or wheezing.  CARDIOVASCULAR: Regular rate and rhythm, S1-S2, no murmurs  GASTROINTESTINAL: Soft, non-tender, non-distended, no guarding.  NEUROLOGIC: AAOX0, nonresponsive to commands      -----------------------------------------------------------------------------------------------------------------------------------------------------------------------------------------------

## 2022-05-17 NOTE — PROGRESS NOTE ADULT - ASSESSMENT
33 y/o F unknown PMHx presents to ED in ROSC s/p cardiac arrest    Acute hypoxemic respiratory failure secondary to suspected drug overdose s/p cardiac arrest   HAGMA secondary to lactic acidosis  Aspiration pna  -as per EMS, pt was with friend, developed agonal breathing and went into asystole per EMS; ACLS initiated and ROSC achieved after 20 minutes  -pt intubated in ED, started on levophed  -put on sedation during intubated, however currently off sedation  -HAGMA improving ,lactate 11.30---->1.9  - CTH showing downward central herniation and b/l uncal herniation  -neurosurgery consulted for CTH head findings; no surgical intervention, NSE sent  -tox positive for benzos and cocaine  -f/u vEEG  -continue unasyn 1.5 q6 for asp pna; started 5/15  -neurocrit following, will await final recs regarding when to initiate brain death protocol  -next of kin is Juan Alfaro pts brother ([phone]); can reach out to him regarding updates    Hypernatremia secondary to possible CDI  -Na on admission 136  -serum osm 354  -continue D5 infusion 125cc/hr  -repeat BMP q6  -DDAVP 0.5mcg q8 started yesterday; Na 177--->165  -urine osm 732  -will continue to closely monitor Na    DVT ppx: none  GI ppx: none    Very poor prognosis

## 2022-05-17 NOTE — PROGRESS NOTE ADULT - SUBJECTIVE AND OBJECTIVE BOX
Over Night Events: events noted, still intubated, ventilated, episode of hypertension/ tachycardic overnight/ on levophed 0.05, neuro reviewed    PHYSICAL EXAM    ICU Vital Signs Last 24 Hrs  T(C): 33.9 (17 May 2022 08:00), Max: 35.2 (16 May 2022 12:00)  T(F): 93 (17 May 2022 08:00), Max: 95.4 (16 May 2022 12:00)  HR: 76 (17 May 2022 08:00) (76 - 132)  BP: 90/67 (17 May 2022 08:00) (80/57 - 102/74)  BP(mean): 73 (17 May 2022 08:00) (63 - 82)  ABP: 104/66 (17 May 2022 08:00) (88/54 - 178/112)  ABP(mean): 80 (17 May 2022 08:00) (68 - 134)  RR: 28 (17 May 2022 08:00) (27 - 28)  SpO2: 100% (17 May 2022 08:00) (100% - 100%)      General: ill looking  HEENT: ETT         Lungs: DEC BS BOTH BASES  Cardiovascular: TALI 2.6  Abdomen: Soft, Positive BS  Extremities: No clubbing   no brainstem acivity    22 @ 07:01  -  22 @ 07:00  --------------------------------------------------------  IN:    dextrose 5%: 2800 mL    IV PiggyBack: 350 mL    Norepinephrine: 346 mL  Total IN: 3496 mL    OUT:    Dexmedetomidine: 0 mL    FentaNYL: 0 mL    Indwelling Catheter - Urethral (mL): 2730 mL  Total OUT: 2730 mL    Total NET: 766 mL      22 @ 07:01  -  22 @ 08:10  --------------------------------------------------------  IN:    dextrose 5%: 125 mL    Norepinephrine: 7 mL  Total IN: 132 mL    OUT:    Indwelling Catheter - Urethral (mL): 15 mL  Total OUT: 15 mL    Total NET: 117 mL          LABS:                          13.1   21.22 )-----------( 157      ( 17 May 2022 04:55 )             38.6                                               05-    165<HH>  |  139<H>  |  19  ----------------------------<  190<H>  3.5   |  17  |  0.8    Ca    9.3      17 May 2022 04:55  Mg     2.2         TPro  5.5<L>  /  Alb  3.0<L>  /  TBili  0.3  /  DBili  x   /  AST  61<H>  /  ALT  79<H>  /  AlkPhos  71                                               Urinalysis Basic - ( 16 May 2022 22:18 )    Color: Yellow / Appearance: Slightly Turbid / S.024 / pH: x  Gluc: x / Ketone: Negative  / Bili: Negative / Urobili: <2 mg/dL   Blood: x / Protein: 100 mg/dL / Nitrite: Negative   Leuk Esterase: Negative / RBC: 2 /HPF / WBC 22 /HPF   Sq Epi: x / Non Sq Epi: 16 /HPF / Bacteria: Negative        CARDIAC MARKERS ( 15 May 2022 19:56 )  x     / 0.02 ng/mL / x     / x     / x                                                LIVER FUNCTIONS - ( 17 May 2022 04:55 )  Alb: 3.0 g/dL / Pro: 5.5 g/dL / ALK PHOS: 71 U/L / ALT: 79 U/L / AST: 61 U/L / GGT: x                                                                                               Mode: AC/ CMV (Assist Control/ Continuous Mandatory Ventilation)  RR (machine): 28  TV (machine): 450  FiO2: 30  PEEP: 5  ITime: 1  MAP: 15  PIP: 24                                      ABG - ( 17 May 2022 03:41 )  pH, Arterial: 7.47  pH, Blood: x     /  pCO2: 25    /  pO2: 171   / HCO3: 18    / Base Excess: -3.6  /  SaO2: 99.1                MEDICATIONS  (STANDING):  ampicillin/sulbactam  IVPB      ampicillin/sulbactam  IVPB 1.5 Gram(s) IV Intermittent every 6 hours  artificial  tears Solution 1 Drop(s) Both EYES every 6 hours  chlorhexidine 0.12% Liquid 15 milliLiter(s) Oral Mucosa every 12 hours  chlorhexidine 4% Liquid 1 Application(s) Topical <User Schedule>  desmopressin Injectable 0.5 MICROGram(s) SubCutaneous <User Schedule>  dextrose 5%. 1000 milliLiter(s) (125 mL/Hr) IV Continuous <Continuous>  lactated ringers Bolus 1000 milliLiter(s) IV Bolus once  norepinephrine Infusion 0.05 MICROgram(s)/kG/Min (7.5 mL/Hr) IV Continuous <Continuous>  pantoprazole   Suspension 40 milliGRAM(s) Oral daily    CXR reviewed

## 2022-05-17 NOTE — PROGRESS NOTE ADULT - SUBJECTIVE AND OBJECTIVE BOX
SUMMARY: HPI:  31 y/o F unknown PMHx presents to ED in ROSC s/p cardiac arrest. Per EMS pt was w/ her friend and developed agonal breathing and went into asystole per EMS. LMA placed by EMS, given epi x3 and calcium x1, and 4 of intranasal narcan by family. ROSC was obtained after ~ 20 minutes.  In ED patient was intubated and started on levophed. Lactate 11.3 -> 2.6.  Tried calling numbers in chart with no response.  (15 May 2022 11:32)    NCC consulted 2/2 neuro prognostication s/p cardiac arrest    REVIEW OF SYSTEMS: Patient unable to participate in ROS due to neurologic status.     VITALS: [X] Reviewed    IMAGING/DATA: [X] Reviewed    IVF FLUIDS/MEDICATIONS: [X] Reviewed    ALLERGIES: Allergies    azithromycin (Unknown)    Intolerances      EXAMINATION:  General: No acute distress  HEENT: Anicteric sclerae  Cardiac: [id number]  Lungs: Clear  Abdomen: Soft, non-tender, +BS  Extremities: No c/c/e  Skin/Incision Site: Clean, dry and intact  Neurologic: mechanically vented, no sedation, does not open eyes to voice or noxious stimuli, does not track to voice, does not follow simple or complex commands, Pupils 6mm fixed bilaterally. (-) blink to threat bilaterally. No gaze preference or nystagmus noted. (-) corneal reflex bilaterally (-) oculocephalic reflex (-) cough reflex (-) gag reflex. Face grossly symmetrical with no loss of nasolabial folds. Normal bulk and tone. No movement to noxious stimuli bilaterally throughout.     Imaging:  EXAM:  CT BRAIN [5/15/2022]  FINDINGS:  Diffuse loss of gray-white matter differentiation as well as diffuse cerebral edema. There is effacement of the basal cisterns as well as downward central herniation and bilateral uncal herniation. There is complete effacement of the third ventricle with near complete effacement of the fourth ventricle.  No acute intracranial hemorrhage. No extra-axial collection.  The visualized paranasal sinuses and mastoid air cells are clear.  The calvarium is intact.  IMPRESSION:  Diffuse loss of gray-white matter differentiation as well as diffuse cerebral edema. There is effacement of the basal cisterns as well as downward central herniation and bilateral uncal herniation. There is complete effacement of the third ventricle with near complete effacement of the fourth ventricle. Findings represent diffuse anoxic brain injury and increased intracranial pressure.    VEEG in the last 24 hours: [5/16/2022]  Background-----------  very low amplitude  PDR reaching 2-3 hz  superimposed by large amount of low amplitude fast activity. The BG shows no reactivity  Focal and generalized slowing-------------severe generalized slowing  Interictal activity-------- none  Events-----------  none  Seizures----------  none  Impression:  abnormal as above    VEEG in the last 24 hours: [5/17/2022]  Background - very low amplitude, not reactive. At the sensitivity of 3 mV only showing scattered activity suggestive of cortical origin  Focal and generalized slowing - severe generalized slowing  Interictal activity - none  Events - none  Seizures - none  Impression: Abnormal VEEG as above    ICU Vital Signs Last 24 Hrs  T(C): 34 (17 May 2022 12:00), Max: 35.1 (16 May 2022 16:00)  T(F): 93.2 (17 May 2022 12:00), Max: 95.2 (16 May 2022 16:00)  HR: 68 (17 May 2022 12:00) (68 - 132)  BP: 85/61 (17 May 2022 12:00) (80/57 - 102/74)  BP(mean): 68 (17 May 2022 12:00) (63 - 82)  ABP: 104/60 (17 May 2022 12:00) (88/54 - 178/112)  ABP(mean): 74 (17 May 2022 12:00) (68 - 134)  RR: 22 (17 May 2022 12:00) (17 - 28)  SpO2: 100% (17 May 2022 12:00) (100% - 100%)      05-16-22 @ 07:01  -  05-17-22 @ 07:00  --------------------------------------------------------  IN: 3496 mL / OUT: 2730 mL / NET: 766 mL    05-17-22 @ 07:01  -  05-17-22 @ 12:13  --------------------------------------------------------  IN: 578 mL / OUT: 45 mL / NET: 533 mL        Mode: AC/ CMV (Assist Control/ Continuous Mandatory Ventilation), RR (machine): 22, TV (machine): 450, FiO2: 30, PEEP: 5, ITime: 1, MAP: 15, PIP: 32    ampicillin/sulbactam  IVPB      ampicillin/sulbactam  IVPB 1.5 Gram(s) IV Intermittent every 6 hours  artificial  tears Solution 1 Drop(s) Both EYES every 6 hours  chlorhexidine 0.12% Liquid 15 milliLiter(s) Oral Mucosa every 12 hours  chlorhexidine 4% Liquid 1 Application(s) Topical <User Schedule>  desmopressin Injectable 0.5 MICROGram(s) SubCutaneous <User Schedule>  dextrose 5%. 1000 milliLiter(s) (125 mL/Hr) IV Continuous <Continuous>  lactated ringers Bolus 1000 milliLiter(s) IV Bolus once  norepinephrine Infusion 0.05 MICROgram(s)/kG/Min (7.5 mL/Hr) IV Continuous <Continuous>  pantoprazole   Suspension 40 milliGRAM(s) Oral daily      LABS:  Na: 165 (05-17 @ 04:55), 172 (05-16 @ 23:50), 173 (05-16 @ 20:55), 177 (05-16 @ 16:00), 174 (05-16 @ 11:38), 169 (05-16 @ 05:00), 168 (05-16 @ 02:35), 160 (05-15 @ 23:09), 150 (05-15 @ 19:56), 136 (05-15 @ 05:59)  K: 3.5 (05-17 @ 04:55), 4.2 (05-16 @ 23:50), 2.9 (05-16 @ 20:55), 3.1 (05-16 @ 16:00), 3.3 (05-16 @ 11:38), 3.7 (05-16 @ 05:00), 3.5 (05-16 @ 02:35), 2.8 (05-15 @ 23:09), 2.0 (05-15 @ 19:56), 4.3 (05-15 @ 05:59)  Cl: 139 (05-17 @ 04:55), 147 (05-16 @ 23:50), 145 (05-16 @ 20:55), 149 (05-16 @ 16:00), 147 (05-16 @ 11:38), 141 (05-16 @ 05:00), 140 (05-16 @ 02:35), 131 (05-15 @ 23:09), 120 (05-15 @ 19:56), 100 (05-15 @ 05:59)  CO2: 17 (05-17 @ 04:55), 17 (05-16 @ 23:50), 16 (05-16 @ 20:55), 15 (05-16 @ 16:00), 15 (05-16 @ 11:38), 18 (05-16 @ 05:00), 19 (05-16 @ 02:35), 16 (05-15 @ 23:09), 17 (05-15 @ 19:56), 12 (05-15 @ 05:59)  BUN: 19 (05-17 @ 04:55), 19 (05-16 @ 23:50), 18 (05-16 @ 20:55), 17 (05-16 @ 16:00), 18 (05-16 @ 11:38), 18 (05-16 @ 05:00), 18 (05-16 @ 02:35), 17 (05-15 @ 23:09), 17 (05-15 @ 19:56), 9 (05-15 @ 05:59)  Cr: 0.8 (05-17 @ 04:55), 0.9 (05-16 @ 23:50), 1.1 (05-16 @ 20:55), 1.2 (05-16 @ 16:00), 1.2 (05-16 @ 11:38), 1.1 (05-16 @ 05:00), 1.3 (05-16 @ 02:35), 1.3 (05-15 @ 23:09), 1.3 (05-15 @ 19:56), 1.3 (05-15 @ 05:59)  Glu: 190(05-17 @ 04:55), 227(05-16 @ 23:50), 266(05-16 @ 20:55), 276(05-16 @ 16:00), 254(05-16 @ 11:38), 232(05-16 @ 05:00), 227(05-16 @ 02:35), 239(05-15 @ 23:09), 241(05-15 @ 19:56), 437(05-15 @ 05:59)    Hgb: 13.1 (05-17 @ 04:55), 15.1 (05-16 @ 05:00), 11.9 (05-15 @ 05:59)  Hct: 38.6 (05-17 @ 04:55), 45.3 (05-16 @ 05:00), 38.7 (05-15 @ 05:59)  WBC: 21.22 (05-17 @ 04:55), 31.49 (05-16 @ 05:00), 13.79 (05-15 @ 05:59)  Plt: 157 (05-17 @ 04:55), 243 (05-16 @ 05:00), 286 (05-15 @ 05:59)    INR:   PTT:     LIVER FUNCTIONS - ( 17 May 2022 04:55 )  Alb: 3.0 g/dL / Pro: 5.5 g/dL / ALK PHOS: 71 U/L / ALT: 79 U/L / AST: 61 U/L / GGT: x           ABG - ( 17 May 2022 03:41 )  pH, Arterial: 7.47  pH, Blood: x     /  pCO2: 25    /  pO2: 171   / HCO3: 18    / Base Excess: -3.6  /  SaO2: 99.1

## 2022-05-17 NOTE — PROGRESS NOTE ADULT - ASSESSMENT
ASSESSMENT: 32-year-old s/p cardiac arrest, ROSC after 20 minutes, no TTM, intubated for airway protection.       RECOMMENDATIONS:   - Continue Video EEG, negative  - Maintain temperature 35C; avoid fevers  - Increase D5W to 200cc/hr for correct Serum Na   - Start free H2O 250cc q6hrs via NGT to correct hypernatremia   - Trend Serum Na/UA/urine osmo q4hrs  - Maintain Potassium > 4, Magnesium > 2, and Phosphorous > 3  - Hold DDAVP  - Given current neurological examination, prognosis is poor; however, cannot declare neuroprognostication at this time 2/2 electrolyte abnormalities       Kathleen Garnett, [facility]  [phone]

## 2022-05-17 NOTE — PROGRESS NOTE ADULT - ASSESSMENT
IMPRESSION:    Acute Hypoxemic Respiratory Failure on 30%  SP CPA   Anoxic brain injury   aspiration pneumonia  DI? sp ddavp  Elevated ICP   Lactic Acidosis, downtrending   GRACE   Aspiration pneumonia   cociane/ BZD    PLAN:    CNS: FU MS.  Monitor off sedation. EEG, Neuro fup    HEENT: Oral care    PULMONARY:  HOB @ 45 degrees.  Aspiration precautions. Keep Sao2 92 to 96%, dec rr, repeat ABG    CARDIOVASCULAR: d5 5    GI: GI prophylaxis.  OGT in place, feeding    RENAL:  Follow up lytes.  Correct as needed.    INFECTIOUS DISEASE: organ transplant  HEMATOLOGICAL:  DVT prophylaxis.      ENDOCRINE:  Follow up FS.  Insulin protocol if needed.      MUSCULOSKELETAL: bedrest     MICU monitoring     very poor prognosis prognosis     organ donation fup    femoral/ a line/ padron 5/15

## 2022-05-18 NOTE — PROGRESS NOTE ADULT - SUBJECTIVE AND OBJECTIVE BOX
----------Daily Progress Note----------    HISTORY OF PRESENT ILLNESS:  Patient is a 32y old Female who presents with a chief complaint of cardiac arrest (18 May 2022 08:29)    Currently admitted to medicine with the primary diagnosis of Cardiac arrest       Today is hospital day 3d.     INTERVAL HOSPITAL COURSE / OVERNIGHT EVENTS:    No overnight events    Review of Systems: Otherwise unremarkable     <<<<<PAST MEDICAL & SURGICAL HISTORY>>>>>  Fibromyalgia    Anxiety and depression      ALLERGIES  azithromycin (Unknown)      Home Medications:        MEDICATIONS  STANDING MEDICATIONS  ampicillin/sulbactam  IVPB      ampicillin/sulbactam  IVPB 1.5 Gram(s) IV Intermittent every 6 hours  artificial  tears Solution 1 Drop(s) Both EYES every 6 hours  chlorhexidine 0.12% Liquid 15 milliLiter(s) Oral Mucosa every 12 hours  chlorhexidine 4% Liquid 1 Application(s) Topical <User Schedule>  desmopressin Injectable 0.5 MICROGram(s) SubCutaneous every 12 hours  dextrose 5%. 1000 milliLiter(s) IV Continuous <Continuous>  lactated ringers Bolus 1000 milliLiter(s) IV Bolus once  norepinephrine Infusion 0.05 MICROgram(s)/kG/Min IV Continuous <Continuous>  pantoprazole   Suspension 40 milliGRAM(s) Oral daily    PRN MEDICATIONS    VITALS:  T(F): 199.4  HR: 68  BP: 95/66  RR: 22  SpO2: 100%    <<<<<LABS>>>>>                        12.1   15.73 )-----------( 106      ( 18 May 2022 04:30 )             35.0     05-18    156<H>  |  129<H>  |  12  ----------------------------<  184<H>  3.0<L>   |  18  |  0.7    Ca    8.4<L>      18 May 2022 04:30  Phos  1.1     05-18  Mg     1.9     05-18    TPro  5.5<L>  /  Alb  3.0<L>  /  TBili  0.3  /  DBili  x   /  AST  61<H>  /  ALT  79<H>  /  AlkPhos  71  05-17      Urinalysis Basic - ( 18 May 2022 01:05 )    Color: Colorless / Appearance: Clear / S.005 / pH: x  Gluc: x / Ketone: Negative  / Bili: Negative / Urobili: <2 mg/dL   Blood: x / Protein: Trace / Nitrite: Negative   Leuk Esterase: Negative / RBC: 5 /HPF / WBC 3 /HPF   Sq Epi: x / Non Sq Epi: 2 /HPF / Bacteria: Negative      ABG - ( 18 May 2022 03:27 )  pH, Arterial: 7.42  pH, Blood: x     /  pCO2: 28    /  pO2: 148   / HCO3: 18    / Base Excess: -4.9  /  SaO2: 98.7                [id number]        <<<<<RADIOLOGY>>>>>    < from: Xray Chest 1 View- PORTABLE-Routine (Xray Chest 1 View- PORTABLE-Routine in AM.) (22 @ 05:19) >  PROCEDURE DATE:  2022          INTERPRETATION:  Clinical History / Reason for exam: Shortness of breath    Comparison : Chest radiograph dated 2022 at 5:16 AM.    Technique/Positioning: Frontal view of the chest is submitted for review.    Findings-  impression:    Support devices: Endotracheal and enteric tubes in appropriate position.    Cardiac/mediastinum/hilum: Stable.    Lung parenchyma/Pleura: Increasing bibasilar opacities. No pneumothorax.    Skeleton/soft tissues: Stable.    < end of copied text >      <<<<<PHYSICAL EXAM>>>>>  GENERAL: intubated, ill-appearing  PULMONARY: Clear to auscultation bilaterally. No rales, rhonchi, or wheezing.  CARDIOVASCULAR: Regular rate and rhythm, S1-S2, no murmurs  GASTROINTESTINAL: Soft, non-tender, non-distended, no guarding.  NEUROLOGIC: AAOX0, nonresponsive to commands        -----------------------------------------------------------------------------------------------------------------------------------------------------------------------------------------------

## 2022-05-18 NOTE — EEG REPORT - NS EEG TEXT BOX
Epilepsy Attending Note:     FREDDY CHAMBERS    32y Female  MRN [id number]    Vital Signs Last 24 Hrs  T(C): 33.9 (18 May 2022 08:00), Max: 93 (18 May 2022 08:00)  T(F): 199.4 (18 May 2022 08:00), Max: 199.4 (18 May 2022 08:00)  HR: 68 (18 May 2022 09:00) (58 - 72)  BP: 95/66 (18 May 2022 07:45) (84/57 - 151/108)  BP(mean): 75 (18 May 2022 07:45) (64 - 124)  RR: 22 (18 May 2022 09:00) (17 - 22)  SpO2: 100% (18 May 2022 09:00) (100% - 100%)                          12.1   15.73 )-----------( 106      ( 18 May 2022 04:30 )             35.0       05-18    156<H>  |  129<H>  |  12  ----------------------------<  184<H>  3.0<L>   |  18  |  0.7    Ca    8.4<L>      18 May 2022 04:30  Phos  1.1     05-18  Mg     1.9     05-18    TPro  5.5<L>  /  Alb  3.0<L>  /  TBili  0.3  /  DBili  x   /  AST  61<H>  /  ALT  79<H>  /  AlkPhos  71  05-17      MEDICATIONS  (STANDING):  ampicillin/sulbactam  IVPB      ampicillin/sulbactam  IVPB 1.5 Gram(s) IV Intermittent every 6 hours  artificial  tears Solution 1 Drop(s) Both EYES every 6 hours  chlorhexidine 0.12% Liquid 15 milliLiter(s) Oral Mucosa every 12 hours  chlorhexidine 4% Liquid 1 Application(s) Topical <User Schedule>  desmopressin Injectable 0.5 MICROGram(s) SubCutaneous every 12 hours  dextrose 5%. 1000 milliLiter(s) (200 mL/Hr) IV Continuous <Continuous>  lactated ringers Bolus 1000 milliLiter(s) IV Bolus once  norepinephrine Infusion 0.05 MICROgram(s)/kG/Min (7.5 mL/Hr) IV Continuous <Continuous>  pantoprazole   Suspension 40 milliGRAM(s) Oral daily    MEDICATIONS  (PRN):        VEEG in the last 24 hours: Intubated, not sedated    Background - very low amplitude, not reactive. At the sensitivity of 3 mV only showing scattered 1-3 Hz activity suggestive of cortical origin.    Focal and generalized slowing - severe generalized slowing    Interictal activity - none    Events - none    Seizures - none    Impression: Abnormal VEEG as above    Plan - per [facility]CC team

## 2022-05-18 NOTE — PROGRESS NOTE ADULT - SUBJECTIVE AND OBJECTIVE BOX
Over Night Events: events noted, still intubated, ventilated, on levophed 0.5m d5 w 200 cc.h    PHYSICAL EXAM    ICU Vital Signs Last 24 Hrs  T(C): 33.5 (18 May 2022 04:00), Max: 34 (17 May 2022 12:00)  T(F): 92.3 (18 May 2022 04:00), Max: 93.2 (17 May 2022 12:00)  HR: 70 (18 May 2022 07:00) (58 - 72)  BP: 101/77 (18 May 2022 07:00) (84/57 - 151/108)  BP(mean): 84 (18 May 2022 07:00) (64 - 124)  ABP: 112/68 (18 May 2022 07:00) (96/56 - 150/96)  ABP(mean): 86 (18 May 2022 07:00) (72 - 120)  RR: 22 (18 May 2022 07:00) (17 - 22)  SpO2: 100% (18 May 2022 07:00) (100% - 100%)      General: ILL looking  HEENT:ETT  Lungs: dec bs both bases  Cardiovascular: Regular   Abdomen: Soft, Positive BS  Extremities: No clubbing   no brainstem activity    22 @ 07:01  -  22 @ 07:00  --------------------------------------------------------  IN:    dextrose 5%: 3900 mL    Free Water: 750 mL    IV PiggyBack: 850 mL    Norepinephrine: 172.3 mL  Total IN: 5672.3 mL    OUT:    Indwelling Catheter - Urethral (mL): 2095 mL  Total OUT: 2095 mL    Total NET: 3577.3 mL          LABS:                          12.1   15.73 )-----------( 106      ( 18 May 2022 04:30 )             35.0                                               05-18    156<H>  |  129<H>  |  12  ----------------------------<  184<H>  3.0<L>   |  18  |  0.7    Ca    8.4<L>      18 May 2022 04:30  Phos  1.1     05-18  Mg     1.9     05-18    TPro  5.5<L>  /  Alb  3.0<L>  /  TBili  0.3  /  DBili  x   /  AST  61<H>  /  ALT  79<H>  /  AlkPhos  71  05-17                                             Urinalysis Basic - ( 18 May 2022 01:05 )    Color: Colorless / Appearance: Clear / S.005 / pH: x  Gluc: x / Ketone: Negative  / Bili: Negative / Urobili: <2 mg/dL   Blood: x / Protein: Trace / Nitrite: Negative   Leuk Esterase: Negative / RBC: 5 /HPF / WBC 3 /HPF   Sq Epi: x / Non Sq Epi: 2 /HPF / Bacteria: Negative                                                  LIVER FUNCTIONS - ( 17 May 2022 04:55 )  Alb: 3.0 g/dL / Pro: 5.5 g/dL / ALK PHOS: 71 U/L / ALT: 79 U/L / AST: 61 U/L / GGT: x                                                                                               Mode: AC/ CMV (Assist Control/ Continuous Mandatory Ventilation)  RR (machine): 22  TV (machine): 450  FiO2: 30  PEEP: 8  ITime: 1  MAP: 13  PIP: 24                                      ABG - ( 18 May 2022 03:27 )  pH, Arterial: 7.42  pH, Blood: x     /  pCO2: 28    /  pO2: 148   / HCO3: 18    / Base Excess: -4.9  /  SaO2: 98.7                MEDICATIONS  (STANDING):  ampicillin/sulbactam  IVPB      ampicillin/sulbactam  IVPB 1.5 Gram(s) IV Intermittent every 6 hours  artificial  tears Solution 1 Drop(s) Both EYES every 6 hours  chlorhexidine 0.12% Liquid 15 milliLiter(s) Oral Mucosa every 12 hours  chlorhexidine 4% Liquid 1 Application(s) Topical <User Schedule>  desmopressin Injectable 0.5 MICROGram(s) SubCutaneous every 12 hours  dextrose 5%. 1000 milliLiter(s) (200 mL/Hr) IV Continuous <Continuous>  lactated ringers Bolus 1000 milliLiter(s) IV Bolus once  norepinephrine Infusion 0.05 MICROgram(s)/kG/Min (7.5 mL/Hr) IV Continuous <Continuous>  pantoprazole   Suspension 40 milliGRAM(s) Oral daily    CXR reviewed

## 2022-05-18 NOTE — PROGRESS NOTE ADULT - ASSESSMENT
31 y/o F unknown PMHx presents to ED in ROSC s/p cardiac arrest    Acute hypoxemic respiratory failure secondary to suspected drug overdose s/p cardiac arrest   HAGMA secondary to lactic acidosis  Aspiration pna  -as per EMS, pt was with friend, developed agonal breathing and went into asystole per EMS; ACLS initiated and ROSC achieved after 20 minutes  -pt intubated in ED, started on levophed  -put on sedation during intubated, however currently off sedation  -HAGMA improving ,lactate 11.30---->1.9  - CTH showing downward central herniation and b/l uncal herniation  -neurosurgery consulted for CTH head findings; no surgical intervention, NSE sent  -tox positive for benzos and cocaine  -f/u vEEG  -continue unasyn 1.5 q6 for asp pna; started 5/15  -neurocrit following  -next of kin is Juan Alfaro pts brother ([phone]); can reach out to him regarding updates  -will initiate brain death protocol once Na <155, T <36.5 C    Hypernatremia secondary to possible CDI  -Na on admission 136  -serum osm 354  -continue D5 infusion 200cc/hr  -free water flush 250cc q6  -repeat BMP q6  -urine osm 732  -Na 156    DVT ppx: none  GI ppx: none    Very poor prognosis

## 2022-05-18 NOTE — PROGRESS NOTE ADULT - ASSESSMENT
IMPRESSION:    Acute Hypoxemic Respiratory Failure on 30%  SP CPA   Anoxic brain injury   aspiration pneumonia  DI? sp ddavp  Elevated ICP   Lactic Acidosis, downtrending   GRACE   Aspiration pneumonia   cociane/ BZD in urine    PLAN:    CNS: FU MS.  Monitor off sedation. Neuro fup, correct T    HEENT: Oral care    PULMONARY:  HOB @ 45 degrees.  Aspiration precautions. Keep Sao2 92 to 96%,  monitor plateau/ plateau    CARDIOVASCULAR: d5 5, feeding    GI: GI prophylaxis.  OGT in place, feeding    RENAL:  Follow up lytes.  Correct as needed.    INFECTIOUS DISEASE: organ transplant  HEMATOLOGICAL:  DVT prophylaxis.      ENDOCRINE:  Follow up FS.  Insulin protocol if needed.      MUSCULOSKELETAL: bedrest     MICU monitoring     very poor prognosis prognosis     organ donation fup    femoral/ a line/ padron 5/15     IMPRESSION:    Acute Hypoxemic Respiratory Failure on 30%  SP CPA   Anoxic brain injury   aspiration pneumonia  DI? sp ddavp  Elevated ICP   Lactic Acidosis, downtrending   GRACE   Aspiration pneumonia   cociane/ BZD in urine    PLAN:    CNS: FU MS.  Monitor off sedation. Neuro fup, correct T    HEENT: Oral care    PULMONARY:  HOB @ 45 degrees.  Aspiration precautions. Keep Sao2 92 to 96%,  monitor plateau/ plateau    CARDIOVASCULAR: d5 5, feeding    GI: GI prophylaxis.  OGT in place, feeding    RENAL:  Follow up lytes.  Correct as needed.    INFECTIOUS DISEASE: organ transplant  HEMATOLOGICAL:  DVT prophylaxis.      ENDOCRINE:  Follow up FS.  Insulin protocol if needed.      MUSCULOSKELETAL: bedrest     MICU monitoring     very poor prognosis prognosis     organ donation fup    femoral/ a line/ padron 5/15    spoke with brother patient with no brainstem activity, will initiate brain death protocol when normothermic

## 2022-05-19 NOTE — PROGRESS NOTE ADULT - REASON FOR ADMISSION
cardiac arrest

## 2022-05-19 NOTE — PROGRESS NOTE ADULT - ASSESSMENT
31 y/o F unknown PMHx presents to ED in ROSC s/p cardiac arrest    Acute hypoxemic respiratory failure secondary to suspected drug overdose s/p cardiac arrest   HAGMA secondary to lactic acidosis  Aspiration pna  -as per EMS, pt was with friend, developed agonal breathing and went into asystole per EMS; ACLS initiated and ROSC achieved after 20 minutes  -pt intubated in ED, started on levophed  -put on sedation during intubated, however currently off sedation  -HAGMA improving ,lactate 11.30---->1.9  - CTH showing downward central herniation and b/l uncal herniation  -neurosurgery consulted for CTH head findings; no surgical intervention, NSE sent  -tox positive for benzos and cocaine  -f/u vEEG  -continue unasyn 1.5 q6 for asp pna; started 5/15  -neurocrit following  -next of kin is Juan Alfaro pts brother ([phone]); can reach out to him regarding updates  -will initiate brain death protocol once Na <155, T <36.5 C  -general neurology consulted to help in initiating brain death protocol    Hypernatremia secondary to possible CDI  -Na on admission 136  -serum osm 354  -D5 discontinued  -free water flush 250cc q6  -repeat BMP q6  -urine osm 732  -Na 148    DVT ppx: none  GI ppx: none    Very poor prognosis

## 2022-05-19 NOTE — PROGRESS NOTE ADULT - ASSESSMENT
IMPRESSION:    Acute Hypoxemic Respiratory Failure on 30%  SP CPA   Anoxic brain injury   aspiration pneumonia  DI? sp ddavp  Elevated ICP   Lactic Acidosis, downtrending   GRACE   Aspiration pneumonia   cociane/ BZD in urine    PLAN:    CNS: FU MS.  Monitor off sedation. Neuro fup, correct T, initiate brain death protocol    HEENT: Oral care    PULMONARY:  HOB @ 45 degrees.  Aspiration precautions. Keep Sao2 92 to 96%,  monitor plateau/ plateau    CARDIOVASCULAR: dc d 5 w    GI: GI prophylaxis.  OGT in place, feeding    RENAL:  Follow up lytes.  Correct as needed.    INFECTIOUS DISEASE: organ transplant  HEMATOLOGICAL:  DVT prophylaxis.      ENDOCRINE:  Follow up FS.  Insulin protocol if needed.      MUSCULOSKELETAL: bedrest     MICU monitoring     very poor prognosis prognosis     organ donation fup    femoral/ a line/ padron 5/15    spoke with brother patient with no brainstem activity, will initiate brain death protocol when normothermic IMPRESSION:    Acute Hypoxemic Respiratory Failure on 30%  SP CPA   Anoxic brain injury   aspiration pneumonia  DI? sp ddavp  Elevated ICP   Lactic Acidosis, downtrending   GRACE   Aspiration pneumonia   cociane/ BZD in urine  thrombocytopenia    PLAN:    CNS: FU MS.  Monitor off sedation. Neuro fup, correct T, initiate brain death protocol    HEENT: Oral care    PULMONARY:  HOB @ 45 degrees.  Aspiration precautions. Keep Sao2 92 to 96%,  monitor plateau/ plateau    CARDIOVASCULAR: dc d 5 w    GI: GI prophylaxis.  OGT in place, feeding    RENAL:  Follow up lytes.  Correct as needed.    INFECTIOUS DISEASE: organ transplant  HEMATOLOGICAL:  DVT prophylaxis.      ENDOCRINE:  Follow up FS.  Insulin protocol if needed.      MUSCULOSKELETAL: bedrest     MICU monitoring     very poor prognosis prognosis     organ donation fup    femoral/ a line/ padron 5/15    spoke with brother patient with no brainstem activity, will initiate brain death protocol when normothermic

## 2022-05-19 NOTE — PROGRESS NOTE ADULT - SUBJECTIVE AND OBJECTIVE BOX
----------Daily Progress Note----------    HISTORY OF PRESENT ILLNESS:  Patient is a 32y old Female who presents with a chief complaint of cardiac arrest (19 May 2022 08:44)    Currently admitted to medicine with the primary diagnosis of Cardiac arrest       Today is hospital day 4d.     INTERVAL HOSPITAL COURSE / OVERNIGHT EVENTS:    No events overnight    Review of Systems: Otherwise unremarkable     <<<<<PAST MEDICAL & SURGICAL HISTORY>>>>>  Fibromyalgia    Anxiety and depression      ALLERGIES  azithromycin (Unknown)      Home Medications:        MEDICATIONS  STANDING MEDICATIONS  ampicillin/sulbactam  IVPB 1.5 Gram(s) IV Intermittent every 6 hours  ampicillin/sulbactam  IVPB      artificial  tears Solution 1 Drop(s) Both EYES every 6 hours  chlorhexidine 0.12% Liquid 15 milliLiter(s) Oral Mucosa every 12 hours  chlorhexidine 4% Liquid 1 Application(s) Topical <User Schedule>  desmopressin Injectable 0.5 MICROGram(s) SubCutaneous every 12 hours  dextrose 5%. 1000 milliLiter(s) IV Continuous <Continuous>  lactated ringers Bolus 1000 milliLiter(s) IV Bolus once  norepinephrine Infusion 0.05 MICROgram(s)/kG/Min IV Continuous <Continuous>  pantoprazole   Suspension 40 milliGRAM(s) Oral daily    PRN MEDICATIONS    VITALS:  T(F): 97.2  HR: 90  BP: --  RR: 16  SpO2: 99%    <<<<<LABS>>>>>                        12.5   16.38 )-----------( 53       ( 19 May 2022 09:30 )             38.0     05-19    148<H>  |  120<H>  |  12  ----------------------------<  100<H>  4.0   |  18  |  0.7    Ca    7.8<L>      19 May 2022 04:30  Phos  3.1     05-  Mg     1.8     05-    TPro  4.9<L>  /  Alb  2.7<L>  /  TBili  0.5  /  DBili  x   /  AST  34  /  ALT  47<H>  /  AlkPhos  104  05-19    PT/INR - ( 19 May 2022 09:30 )   PT: 15.20 sec;   INR: 1.33 ratio         PTT - ( 19 May 2022 09:30 )  PTT:30.8 sec  Urinalysis Basic - ( 18 May 2022 01:05 )    Color: Colorless / Appearance: Clear / S.005 / pH: x  Gluc: x / Ketone: Negative  / Bili: Negative / Urobili: <2 mg/dL   Blood: x / Protein: Trace / Nitrite: Negative   Leuk Esterase: Negative / RBC: 5 /HPF / WBC 3 /HPF   Sq Epi: x / Non Sq Epi: 2 /HPF / Bacteria: Negative      ABG - ( 19 May 2022 12:43 )  pH, Arterial: 7.05  pH, Blood: x     /  pCO2: 93    /  pO2: 405   / HCO3: 26    / Base Excess: -7.0  /  SaO2: x                   [id number]        <<<<<RADIOLOGY>>>>>    < from: Xray Chest 1 View- PORTABLE-Routine (Xray Chest 1 View- PORTABLE-Routine in AM.) (22 @ 05:51) >  PROCEDURE DATE:  2022          INTERPRETATION:  Clinical History / Reason for exam: Shortness of breath    Comparison : Chest radiograph May 18, 2022.    Technique/Positioning: Frontal chest radiograph.    Findings:    Support devices: Stable ET tube and enteric tube.    Cardiac/mediastinum/hilum: Unchanged.    Lung parenchyma/Pleura: Within normal limits.    Skeleton/soft tissues: Unchanged.    Impression:    No radiographic evidence of acute cardiopulmonary disease.    Support devices as above    < end of copied text >      <<<<<PHYSICAL EXAM>>>>>  GENERAL: intubated, ill-appearing  PULMONARY: Clear to auscultation bilaterally. No rales, rhonchi, or wheezing.  CARDIOVASCULAR: Regular rate and rhythm, S1-S2, no murmurs  GASTROINTESTINAL: Soft, non-tender, non-distended, no guarding.  NEUROLOGIC: AAOX0, nonresponsive to commands        -----------------------------------------------------------------------------------------------------------------------------------------------------------------------------------------------

## 2022-05-19 NOTE — PROGRESS NOTE ADULT - SUBJECTIVE AND OBJECTIVE BOX
Over Night Events: events noted, still intubated, ventilated, on levophed 0.02    PHYSICAL EXAM    ICU Vital Signs Last 24 Hrs  T(C): 36 (19 May 2022 08:00), Max: 36.2 (18 May 2022 21:00)  T(F): 96.9 (19 May 2022 08:00), Max: 97.2 (18 May 2022 21:00)  HR: 94 (19 May 2022 08:00) (66 - 98)  ABP: 138/96 (19 May 2022 08:00) (86/50 - 160/98)  ABP(mean): 106 (19 May 2022 08:00) (64 - 122)  RR: 16 (19 May 2022 08:00) (16 - 22)  SpO2: 100% (19 May 2022 08:00) (100% - 100%)      General: ill looking  HEENT: ACOSTA             Lungs: Bilateral BS  Cardiovascular: Regular   Abdomen: Soft, Positive BS  Extremities: No clubbing   no brainstem activity      22 @ 07:01  -  22 @ 07:00  --------------------------------------------------------  IN:    dextrose 5%: 2200 mL    IV PiggyBack: 599.8 mL    Norepinephrine: 161.7 mL  Total IN: 2961.5 mL    OUT:    Indwelling Catheter - Urethral (mL): 1320 mL  Total OUT: 1320 mL    Total NET: 1641.5 mL          LABS:                          11.4   17.26 )-----------( 55       ( 19 May 2022 04:30 )             33.8                                               05-19    148<H>  |  120<H>  |  12  ----------------------------<  100<H>  4.0   |  18  |  0.7    Ca    7.8<L>      19 May 2022 04:30  Phos  3.1     05-19  Mg     1.8     05-19    TPro  4.9<L>  /  Alb  2.7<L>  /  TBili  0.5  /  DBili  x   /  AST  34  /  ALT  47<H>  /  AlkPhos  104  05-19                                             Urinalysis Basic - ( 18 May 2022 01:05 )    Color: Colorless / Appearance: Clear / S.005 / pH: x  Gluc: x / Ketone: Negative  / Bili: Negative / Urobili: <2 mg/dL   Blood: x / Protein: Trace / Nitrite: Negative   Leuk Esterase: Negative / RBC: 5 /HPF / WBC 3 /HPF   Sq Epi: x / Non Sq Epi: 2 /HPF / Bacteria: Negative                                                  LIVER FUNCTIONS - ( 19 May 2022 04:30 )  Alb: 2.7 g/dL / Pro: 4.9 g/dL / ALK PHOS: 104 U/L / ALT: 47 U/L / AST: 34 U/L / GGT: x                                                                                               Mode: AC/ CMV (Assist Control/ Continuous Mandatory Ventilation)  RR (machine): 22  TV (machine): 450  FiO2: 30  PEEP: 8  ITime: 1  MAP: 13  PIP: 27                                      ABG - ( 19 May 2022 03:31 )  pH, Arterial: 7.44  pH, Blood: x     /  pCO2: 28    /  pO2: 112   / HCO3: 19    / Base Excess: -3.9  /  SaO2: 98.5                MEDICATIONS  (STANDING):  ampicillin/sulbactam  IVPB      ampicillin/sulbactam  IVPB 1.5 Gram(s) IV Intermittent every 6 hours  artificial  tears Solution 1 Drop(s) Both EYES every 6 hours  chlorhexidine 0.12% Liquid 15 milliLiter(s) Oral Mucosa every 12 hours  chlorhexidine 4% Liquid 1 Application(s) Topical <User Schedule>  desmopressin Injectable 0.5 MICROGram(s) SubCutaneous every 12 hours  dextrose 5%. 1000 milliLiter(s) (200 mL/Hr) IV Continuous <Continuous>  lactated ringers Bolus 1000 milliLiter(s) IV Bolus once  norepinephrine Infusion 0.05 MICROgram(s)/kG/Min (7.5 mL/Hr) IV Continuous <Continuous>  pantoprazole   Suspension 40 milliGRAM(s) Oral daily      CXR reviewed

## 2022-05-19 NOTE — EEG REPORT - NS EEG TEXT BOX
Epilepsy Attending Note:     FREDDY CHAMBERS    32y Female  MRN [id number]    Vital Signs Last 24 Hrs  T(C): 36.2 (19 May 2022 10:00), Max: 36.2 (18 May 2022 21:00)  T(F): 97.2 (19 May 2022 10:00), Max: 97.2 (18 May 2022 21:00)  HR: 102 (19 May 2022 10:00) (72 - 102)  BP: --  BP(mean): --  RR: 16 (19 May 2022 10:00) (16 - 22)  SpO2: 100% (19 May 2022 10:00) (100% - 100%)                          12.5   16.38 )-----------( 53       ( 19 May 2022 09:30 )             38.0       05-19    148<H>  |  120<H>  |  12  ----------------------------<  100<H>  4.0   |  18  |  0.7    Ca    7.8<L>      19 May 2022 04:30  Phos  3.1     05-19  Mg     1.8     05-19    TPro  4.9<L>  /  Alb  2.7<L>  /  TBili  0.5  /  DBili  x   /  AST  34  /  ALT  47<H>  /  AlkPhos  104  05-19      MEDICATIONS  (STANDING):  ampicillin/sulbactam  IVPB      ampicillin/sulbactam  IVPB 1.5 Gram(s) IV Intermittent every 6 hours  artificial  tears Solution 1 Drop(s) Both EYES every 6 hours  chlorhexidine 0.12% Liquid 15 milliLiter(s) Oral Mucosa every 12 hours  chlorhexidine 4% Liquid 1 Application(s) Topical <User Schedule>  desmopressin Injectable 0.5 MICROGram(s) SubCutaneous every 12 hours  dextrose 5%. 1000 milliLiter(s) (200 mL/Hr) IV Continuous <Continuous>  lactated ringers Bolus 1000 milliLiter(s) IV Bolus once  norepinephrine Infusion 0.05 MICROgram(s)/kG/Min (7.5 mL/Hr) IV Continuous <Continuous>  pantoprazole   Suspension 40 milliGRAM(s) Oral daily    MEDICATIONS  (PRN):            VEEG in the last 24 hours:    Background-------------  very low amplitude . , not reactive.   No defined cortical discharge at the sensitivity of 3 mv    Focal and generalized slowing--------  sever generalized slowing    Interictal activity---------- none    Events---------  none    Seizures-----------none    Impression:  abnormal as above . In the right setting suggestive of ECS    Plan - as/NCC team

## 2022-05-19 NOTE — DISCHARGE NOTE FOR THE EXPIRED PATIENT - HOSPITAL COURSE
Pt intubated on arrival due to cardiopulmonary arrest, pt was sedated only while intubated, however no sedation therefore. Daily EEG showing severe generalized slowing. Pt was hypernatremic, as high as 177, started on DDAVP Pt intubated on arrival due to cardiopulmonary arrest, pt was sedated only while intubated, however no sedation therefore. Daily EEG showing severe generalized slowing. Pt was hypernatremic, as high as 177, started on DDAVP with gradual improvement in sodium levels, 148 at the time of death. Pt was also being treated for asp pna. Brain death protocol initiated 22 and declared  at 14:56.

## 2022-05-20 LAB
A1C WITH ESTIMATED AVERAGE GLUCOSE RESULT: 5.2 % — SIGNIFICANT CHANGE UP (ref 4–5.6)
ALBUMIN SERPL ELPH-MCNC: 2.6 G/DL — LOW (ref 3.5–5.2)
ALP SERPL-CCNC: 142 U/L — HIGH (ref 30–115)
ALT FLD-CCNC: 30 U/L — SIGNIFICANT CHANGE UP (ref 0–41)
AMYLASE P1 CFR SERPL: 115 U/L — SIGNIFICANT CHANGE UP (ref 25–115)
AMYLASE P1 CFR SERPL: 275 U/L — HIGH (ref 25–115)
AMYLASE P1 CFR SERPL: 354 U/L — CRITICAL HIGH (ref 25–115)
AMYLASE P1 CFR SERPL: 425 U/L — CRITICAL HIGH (ref 25–115)
ANION GAP SERPL CALC-SCNC: 11 MMOL/L — SIGNIFICANT CHANGE UP (ref 7–14)
ANION GAP SERPL CALC-SCNC: 12 MMOL/L — SIGNIFICANT CHANGE UP (ref 7–14)
ANION GAP SERPL CALC-SCNC: 13 MMOL/L — SIGNIFICANT CHANGE UP (ref 7–14)
ANION GAP SERPL CALC-SCNC: 13 MMOL/L — SIGNIFICANT CHANGE UP (ref 7–14)
APPEARANCE UR: CLEAR — SIGNIFICANT CHANGE UP
APTT BLD: 28.6 SEC — SIGNIFICANT CHANGE UP (ref 27–39.2)
APTT BLD: 31 SEC — SIGNIFICANT CHANGE UP (ref 27–39.2)
APTT BLD: 31.7 SEC — SIGNIFICANT CHANGE UP (ref 27–39.2)
AST SERPL-CCNC: 19 U/L — SIGNIFICANT CHANGE UP (ref 0–41)
BACTERIA # UR AUTO: NEGATIVE — SIGNIFICANT CHANGE UP
BASE EXCESS BLDA CALC-SCNC: -2.3 MMOL/L — LOW (ref -2–3)
BASE EXCESS BLDA CALC-SCNC: -3.7 MMOL/L — LOW (ref -2–3)
BASOPHILS # BLD AUTO: 0.02 K/UL — SIGNIFICANT CHANGE UP (ref 0–0.2)
BASOPHILS # BLD AUTO: 0.03 K/UL — SIGNIFICANT CHANGE UP (ref 0–0.2)
BASOPHILS # BLD AUTO: 0.06 K/UL — SIGNIFICANT CHANGE UP (ref 0–0.2)
BASOPHILS NFR BLD AUTO: 0.2 % — SIGNIFICANT CHANGE UP (ref 0–1)
BASOPHILS NFR BLD AUTO: 0.2 % — SIGNIFICANT CHANGE UP (ref 0–1)
BASOPHILS NFR BLD AUTO: 0.3 % — SIGNIFICANT CHANGE UP (ref 0–1)
BILIRUB DIRECT SERPL-MCNC: 0.2 MG/DL — SIGNIFICANT CHANGE UP (ref 0–0.3)
BILIRUB INDIRECT FLD-MCNC: 0.3 MG/DL — SIGNIFICANT CHANGE UP (ref 0.2–1.2)
BILIRUB SERPL-MCNC: 0.5 MG/DL — SIGNIFICANT CHANGE UP (ref 0.2–1.2)
BILIRUB UR-MCNC: NEGATIVE — SIGNIFICANT CHANGE UP
BUN SERPL-MCNC: 15 MG/DL — SIGNIFICANT CHANGE UP (ref 10–20)
BUN SERPL-MCNC: 16 MG/DL — SIGNIFICANT CHANGE UP (ref 10–20)
BUN SERPL-MCNC: 20 MG/DL — SIGNIFICANT CHANGE UP (ref 10–20)
BUN SERPL-MCNC: 21 MG/DL — HIGH (ref 10–20)
CALCIUM SERPL-MCNC: 8.6 MG/DL — SIGNIFICANT CHANGE UP (ref 8.5–10.1)
CALCIUM SERPL-MCNC: 8.6 MG/DL — SIGNIFICANT CHANGE UP (ref 8.5–10.1)
CALCIUM SERPL-MCNC: 8.8 MG/DL — SIGNIFICANT CHANGE UP (ref 8.5–10.1)
CALCIUM SERPL-MCNC: 8.8 MG/DL — SIGNIFICANT CHANGE UP (ref 8.5–10.1)
CHLORIDE SERPL-SCNC: 118 MMOL/L — HIGH (ref 98–110)
CHLORIDE SERPL-SCNC: 124 MMOL/L — HIGH (ref 98–110)
CHLORIDE SERPL-SCNC: 126 MMOL/L — HIGH (ref 98–110)
CHLORIDE SERPL-SCNC: 128 MMOL/L — HIGH (ref 98–110)
CK MB CFR SERPL CALC: 5.4 NG/ML — SIGNIFICANT CHANGE UP (ref 0.6–6.3)
CK MB CFR SERPL CALC: 6.4 NG/ML — HIGH (ref 0.6–6.3)
CK MB CFR SERPL CALC: 7.6 NG/ML — HIGH (ref 0.6–6.3)
CK SERPL-CCNC: 169 U/L — SIGNIFICANT CHANGE UP (ref 0–225)
CK SERPL-CCNC: 258 U/L — HIGH (ref 0–225)
CK SERPL-CCNC: 305 U/L — HIGH (ref 0–225)
CK SERPL-CCNC: 336 U/L — HIGH (ref 0–225)
CO2 SERPL-SCNC: 19 MMOL/L — SIGNIFICANT CHANGE UP (ref 17–32)
CO2 SERPL-SCNC: 21 MMOL/L — SIGNIFICANT CHANGE UP (ref 17–32)
COLOR SPEC: COLORLESS — SIGNIFICANT CHANGE UP
CREAT SERPL-MCNC: 0.8 MG/DL — SIGNIFICANT CHANGE UP (ref 0.7–1.5)
CREAT SERPL-MCNC: 0.9 MG/DL — SIGNIFICANT CHANGE UP (ref 0.7–1.5)
DIFF PNL FLD: ABNORMAL
EGFR: 100 ML/MIN/1.73M2 — SIGNIFICANT CHANGE UP
EGFR: 87 ML/MIN/1.73M2 — SIGNIFICANT CHANGE UP
EOSINOPHIL # BLD AUTO: 0 K/UL — SIGNIFICANT CHANGE UP (ref 0–0.7)
EOSINOPHIL # BLD AUTO: 0.01 K/UL — SIGNIFICANT CHANGE UP (ref 0–0.7)
EOSINOPHIL # BLD AUTO: 0.2 K/UL — SIGNIFICANT CHANGE UP (ref 0–0.7)
EOSINOPHIL NFR BLD AUTO: 0 % — SIGNIFICANT CHANGE UP (ref 0–8)
EOSINOPHIL NFR BLD AUTO: 0.1 % — SIGNIFICANT CHANGE UP (ref 0–8)
EOSINOPHIL NFR BLD AUTO: 1.1 % — SIGNIFICANT CHANGE UP (ref 0–8)
EPI CELLS # UR: 1 /HPF — SIGNIFICANT CHANGE UP (ref 0–5)
ESTIMATED AVERAGE GLUCOSE: 103 MG/DL — SIGNIFICANT CHANGE UP (ref 68–114)
FIBRINOGEN PPP-MCNC: >700 MG/DL — HIGH (ref 204.4–570.6)
GLUCOSE SERPL-MCNC: 188 MG/DL — HIGH (ref 70–99)
GLUCOSE SERPL-MCNC: 219 MG/DL — HIGH (ref 70–99)
GLUCOSE SERPL-MCNC: 298 MG/DL — HIGH (ref 70–99)
GLUCOSE SERPL-MCNC: 442 MG/DL — HIGH (ref 70–99)
GLUCOSE UR QL: NEGATIVE — SIGNIFICANT CHANGE UP
HCO3 BLDA-SCNC: 21 MMOL/L — SIGNIFICANT CHANGE UP (ref 21–28)
HCO3 BLDA-SCNC: 22 MMOL/L — SIGNIFICANT CHANGE UP (ref 21–28)
HCT VFR BLD CALC: 30.9 % — LOW (ref 37–47)
HCT VFR BLD CALC: 40.9 % — SIGNIFICANT CHANGE UP (ref 37–47)
HCT VFR BLD CALC: 42 % — SIGNIFICANT CHANGE UP (ref 37–47)
HGB BLD-MCNC: 10.5 G/DL — LOW (ref 12–16)
HGB BLD-MCNC: 13.5 G/DL — SIGNIFICANT CHANGE UP (ref 12–16)
HGB BLD-MCNC: 13.6 G/DL — SIGNIFICANT CHANGE UP (ref 12–16)
HOROWITZ INDEX BLDA+IHG-RTO: 100 — SIGNIFICANT CHANGE UP
HOROWITZ INDEX BLDA+IHG-RTO: 100 — SIGNIFICANT CHANGE UP
HYALINE CASTS # UR AUTO: 3 /LPF — SIGNIFICANT CHANGE UP (ref 0–7)
IMM GRANULOCYTES NFR BLD AUTO: 0.4 % — HIGH (ref 0.1–0.3)
IMM GRANULOCYTES NFR BLD AUTO: 1.4 % — HIGH (ref 0.1–0.3)
IMM GRANULOCYTES NFR BLD AUTO: 2.1 % — HIGH (ref 0.1–0.3)
INR BLD: 1.37 RATIO — HIGH (ref 0.65–1.3)
INR BLD: 1.43 RATIO — HIGH (ref 0.65–1.3)
INR BLD: 1.54 RATIO — HIGH (ref 0.65–1.3)
KETONES UR-MCNC: NEGATIVE — SIGNIFICANT CHANGE UP
LDH SERPL L TO P-CCNC: 597 — HIGH (ref 50–242)
LDH SERPL L TO P-CCNC: 612 — HIGH (ref 50–242)
LDH SERPL L TO P-CCNC: 623 — HIGH (ref 50–242)
LDH SERPL L TO P-CCNC: 714 — HIGH (ref 50–242)
LEUKOCYTE ESTERASE UR-ACNC: NEGATIVE — SIGNIFICANT CHANGE UP
LIDOCAIN IGE QN: 29 U/L — SIGNIFICANT CHANGE UP (ref 7–60)
LIDOCAIN IGE QN: 70 U/L — HIGH (ref 7–60)
LIDOCAIN IGE QN: 73 U/L — HIGH (ref 7–60)
LIDOCAIN IGE QN: 84 U/L — HIGH (ref 7–60)
LYMPHOCYTES # BLD AUTO: 0.65 K/UL — LOW (ref 1.2–3.4)
LYMPHOCYTES # BLD AUTO: 0.97 K/UL — LOW (ref 1.2–3.4)
LYMPHOCYTES # BLD AUTO: 1.78 K/UL — SIGNIFICANT CHANGE UP (ref 1.2–3.4)
LYMPHOCYTES # BLD AUTO: 4.1 % — LOW (ref 20.5–51.1)
LYMPHOCYTES # BLD AUTO: 8.6 % — LOW (ref 20.5–51.1)
LYMPHOCYTES # BLD AUTO: 9.9 % — LOW (ref 20.5–51.1)
MAGNESIUM SERPL-MCNC: 2.2 MG/DL — SIGNIFICANT CHANGE UP (ref 1.8–2.4)
MAGNESIUM SERPL-MCNC: 2.6 MG/DL — HIGH (ref 1.8–2.4)
MAGNESIUM SERPL-MCNC: 2.6 MG/DL — HIGH (ref 1.8–2.4)
MAGNESIUM SERPL-MCNC: 2.7 MG/DL — HIGH (ref 1.8–2.4)
MCHC RBC-ENTMCNC: 28.5 PG — SIGNIFICANT CHANGE UP (ref 27–31)
MCHC RBC-ENTMCNC: 29 PG — SIGNIFICANT CHANGE UP (ref 27–31)
MCHC RBC-ENTMCNC: 29.1 PG — SIGNIFICANT CHANGE UP (ref 27–31)
MCHC RBC-ENTMCNC: 32.4 G/DL — SIGNIFICANT CHANGE UP (ref 32–37)
MCHC RBC-ENTMCNC: 33 G/DL — SIGNIFICANT CHANGE UP (ref 32–37)
MCHC RBC-ENTMCNC: 34 G/DL — SIGNIFICANT CHANGE UP (ref 32–37)
MCV RBC AUTO: 85.6 FL — SIGNIFICANT CHANGE UP (ref 81–99)
MCV RBC AUTO: 87.8 FL — SIGNIFICANT CHANGE UP (ref 81–99)
MCV RBC AUTO: 87.9 FL — SIGNIFICANT CHANGE UP (ref 81–99)
MONOCYTES # BLD AUTO: 0.33 K/UL — SIGNIFICANT CHANGE UP (ref 0.1–0.6)
MONOCYTES # BLD AUTO: 0.47 K/UL — SIGNIFICANT CHANGE UP (ref 0.1–0.6)
MONOCYTES # BLD AUTO: 0.97 K/UL — HIGH (ref 0.1–0.6)
MONOCYTES NFR BLD AUTO: 2.1 % — SIGNIFICANT CHANGE UP (ref 1.7–9.3)
MONOCYTES NFR BLD AUTO: 4.2 % — SIGNIFICANT CHANGE UP (ref 1.7–9.3)
MONOCYTES NFR BLD AUTO: 5.4 % — SIGNIFICANT CHANGE UP (ref 1.7–9.3)
NEUTROPHILS # BLD AUTO: 14.46 K/UL — HIGH (ref 1.4–6.5)
NEUTROPHILS # BLD AUTO: 14.54 K/UL — HIGH (ref 1.4–6.5)
NEUTROPHILS # BLD AUTO: 9.74 K/UL — HIGH (ref 1.4–6.5)
NEUTROPHILS NFR BLD AUTO: 81.2 % — HIGH (ref 42.2–75.2)
NEUTROPHILS NFR BLD AUTO: 86.6 % — HIGH (ref 42.2–75.2)
NEUTROPHILS NFR BLD AUTO: 92.1 % — HIGH (ref 42.2–75.2)
NITRITE UR-MCNC: NEGATIVE — SIGNIFICANT CHANGE UP
NRBC # BLD: 0 /100 WBCS — SIGNIFICANT CHANGE UP (ref 0–0)
OSMOLALITY UR: 102 MOS/KG — SIGNIFICANT CHANGE UP (ref 50–1200)
OSMOLALITY UR: 89 MOS/KG — SIGNIFICANT CHANGE UP (ref 50–1200)
PCO2 BLDA: 32 MMHG — SIGNIFICANT CHANGE UP (ref 25–48)
PCO2 BLDA: 42 MMHG — SIGNIFICANT CHANGE UP (ref 25–48)
PH BLDA: 7.33 — LOW (ref 7.35–7.45)
PH BLDA: 7.43 — SIGNIFICANT CHANGE UP (ref 7.35–7.45)
PH UR: 6.5 — SIGNIFICANT CHANGE UP (ref 5–8)
PHOSPHATE SERPL-MCNC: 3.9 MG/DL — SIGNIFICANT CHANGE UP (ref 2.1–4.9)
PHOSPHATE SERPL-MCNC: 4.1 MG/DL — SIGNIFICANT CHANGE UP (ref 2.1–4.9)
PHOSPHATE SERPL-MCNC: 4.2 MG/DL — SIGNIFICANT CHANGE UP (ref 2.1–4.9)
PHOSPHATE SERPL-MCNC: 5.3 MG/DL — HIGH (ref 2.1–4.9)
PLATELET # BLD AUTO: 46 K/UL — LOW (ref 130–400)
PLATELET # BLD AUTO: 49 K/UL — LOW (ref 130–400)
PLATELET # BLD AUTO: 64 K/UL — LOW (ref 130–400)
PO2 BLDA: 269 MMHG — HIGH (ref 83–108)
PO2 BLDA: 315 MMHG — HIGH (ref 83–108)
POTASSIUM SERPL-MCNC: 3.5 MMOL/L — SIGNIFICANT CHANGE UP (ref 3.5–5)
POTASSIUM SERPL-MCNC: 4 MMOL/L — SIGNIFICANT CHANGE UP (ref 3.5–5)
POTASSIUM SERPL-MCNC: 4.3 MMOL/L — SIGNIFICANT CHANGE UP (ref 3.5–5)
POTASSIUM SERPL-MCNC: 4.3 MMOL/L — SIGNIFICANT CHANGE UP (ref 3.5–5)
POTASSIUM SERPL-SCNC: 3.5 MMOL/L — SIGNIFICANT CHANGE UP (ref 3.5–5)
POTASSIUM SERPL-SCNC: 4 MMOL/L — SIGNIFICANT CHANGE UP (ref 3.5–5)
POTASSIUM SERPL-SCNC: 4.3 MMOL/L — SIGNIFICANT CHANGE UP (ref 3.5–5)
POTASSIUM SERPL-SCNC: 4.3 MMOL/L — SIGNIFICANT CHANGE UP (ref 3.5–5)
PROT SERPL-MCNC: 5.3 G/DL — LOW (ref 6–8)
PROT UR-MCNC: SIGNIFICANT CHANGE UP
PROTHROM AB SERPL-ACNC: 15.7 SEC — HIGH (ref 9.95–12.87)
PROTHROM AB SERPL-ACNC: 16.4 SEC — HIGH (ref 9.95–12.87)
PROTHROM AB SERPL-ACNC: 17.6 SEC — HIGH (ref 9.95–12.87)
RBC # BLD: 3.61 M/UL — LOW (ref 4.2–5.4)
RBC # BLD: 4.66 M/UL — SIGNIFICANT CHANGE UP (ref 4.2–5.4)
RBC # BLD: 4.78 M/UL — SIGNIFICANT CHANGE UP (ref 4.2–5.4)
RBC # FLD: 15.1 % — HIGH (ref 11.5–14.5)
RBC # FLD: 15.3 % — HIGH (ref 11.5–14.5)
RBC # FLD: 15.4 % — HIGH (ref 11.5–14.5)
RBC CASTS # UR COMP ASSIST: 1 /HPF — SIGNIFICANT CHANGE UP (ref 0–4)
SAO2 % BLDA: 99.2 % — HIGH (ref 94–98)
SAO2 % BLDA: 99.3 % — HIGH (ref 94–98)
SODIUM SERPL-SCNC: 150 MMOL/L — HIGH (ref 135–146)
SODIUM SERPL-SCNC: 155 MMOL/L — HIGH (ref 135–146)
SODIUM SERPL-SCNC: 158 MMOL/L — HIGH (ref 135–146)
SODIUM SERPL-SCNC: 160 MMOL/L — HIGH (ref 135–146)
SP GR SPEC: 1 — LOW (ref 1.01–1.03)
TROPONIN T SERPL-MCNC: <0.01 NG/ML — SIGNIFICANT CHANGE UP
UROBILINOGEN FLD QL: SIGNIFICANT CHANGE UP
WBC # BLD: 11.25 K/UL — HIGH (ref 4.8–10.8)
WBC # BLD: 15.7 K/UL — HIGH (ref 4.8–10.8)
WBC # BLD: 17.93 K/UL — HIGH (ref 4.8–10.8)
WBC # FLD AUTO: 11.25 K/UL — HIGH (ref 4.8–10.8)
WBC # FLD AUTO: 15.7 K/UL — HIGH (ref 4.8–10.8)
WBC # FLD AUTO: 17.93 K/UL — HIGH (ref 4.8–10.8)
WBC UR QL: 1 /HPF — SIGNIFICANT CHANGE UP (ref 0–5)

## 2022-05-20 PROCEDURE — 93306 TTE W/DOPPLER COMPLETE: CPT | Mod: 26

## 2022-05-20 PROCEDURE — 74176 CT ABD & PELVIS W/O CONTRAST: CPT | Mod: 26

## 2022-05-20 PROCEDURE — 93460 R&L HRT ART/VENTRICLE ANGIO: CPT | Mod: 26

## 2022-05-20 PROCEDURE — 71250 CT THORAX DX C-: CPT | Mod: 26

## 2022-05-20 PROCEDURE — 31624 DX BRONCHOSCOPE/LAVAGE: CPT

## 2022-05-20 PROCEDURE — 71045 X-RAY EXAM CHEST 1 VIEW: CPT | Mod: 26

## 2022-05-20 RX ORDER — LEVOTHYROXINE SODIUM 125 MCG
10 TABLET ORAL
Qty: 200 | Refills: 0 | Status: DISCONTINUED | OUTPATIENT
Start: 2022-05-20 | End: 2022-01-01

## 2022-05-20 RX ORDER — VASOPRESSIN 20 [USP'U]/ML
0.5 INJECTION INTRAVENOUS
Qty: 50 | Refills: 0 | Status: DISCONTINUED | OUTPATIENT
Start: 2022-05-20 | End: 2022-05-20

## 2022-05-20 RX ORDER — VASOPRESSIN 20 [USP'U]/ML
0.04 INJECTION INTRAVENOUS
Qty: 50 | Refills: 0 | Status: DISCONTINUED | OUTPATIENT
Start: 2022-05-20 | End: 2022-05-20

## 2022-05-20 RX ORDER — PANTOPRAZOLE SODIUM 20 MG/1
8 TABLET, DELAYED RELEASE ORAL
Qty: 80 | Refills: 0 | Status: DISCONTINUED | OUTPATIENT
Start: 2022-05-20 | End: 2022-01-01

## 2022-05-20 RX ORDER — VASOPRESSIN 20 [USP'U]/ML
2.4 INJECTION INTRAVENOUS
Qty: 50 | Refills: 0 | Status: DISCONTINUED | OUTPATIENT
Start: 2022-05-20 | End: 2022-05-21

## 2022-05-20 RX ORDER — SODIUM CHLORIDE 9 MG/ML
1000 INJECTION, SOLUTION INTRAVENOUS
Refills: 0 | Status: DISCONTINUED | OUTPATIENT
Start: 2022-05-20 | End: 2022-05-21

## 2022-05-20 RX ADMIN — VASOPRESSIN 0.5 UNIT(S)/HR: 20 INJECTION INTRAVENOUS at 03:44

## 2022-05-20 RX ADMIN — SODIUM CHLORIDE 150 MILLILITER(S): 9 INJECTION, SOLUTION INTRAVENOUS at 09:38

## 2022-05-20 RX ADMIN — PIPERACILLIN AND TAZOBACTAM 25 GRAM(S): 4; .5 INJECTION, POWDER, LYOPHILIZED, FOR SOLUTION INTRAVENOUS at 05:53

## 2022-05-20 RX ADMIN — Medication 58 MILLIGRAM(S): at 03:44

## 2022-05-20 RX ADMIN — PIPERACILLIN AND TAZOBACTAM 25 GRAM(S): 4; .5 INJECTION, POWDER, LYOPHILIZED, FOR SOLUTION INTRAVENOUS at 23:42

## 2022-05-20 RX ADMIN — PIPERACILLIN AND TAZOBACTAM 25 GRAM(S): 4; .5 INJECTION, POWDER, LYOPHILIZED, FOR SOLUTION INTRAVENOUS at 11:52

## 2022-05-20 RX ADMIN — PANTOPRAZOLE SODIUM 10 MG/HR: 20 TABLET, DELAYED RELEASE ORAL at 10:17

## 2022-05-20 RX ADMIN — PIPERACILLIN AND TAZOBACTAM 25 GRAM(S): 4; .5 INJECTION, POWDER, LYOPHILIZED, FOR SOLUTION INTRAVENOUS at 19:35

## 2022-05-20 RX ADMIN — Medication 25 MICROGRAM(S)/HR: at 10:17

## 2022-05-20 NOTE — PROCEDURE NOTE - NSBRONCHPROCDETAILS_GEN_A_CORE_FT
Bronchoscope inserted through ETT. ETT noted to be in good position. Airway evaluation revealed Sharp Jg. Bronchoscope then advanced down into KELLY and LLL for further evaluation which revealed normal anatomic appearance of airways, no excessive secretions, no abnormal appearing growths/masses/tissue, no bleeding bronchial washing obtained, subsequently bronchoscope retracted to level of jg and then advanced into CUCA bronchus for examination RUL, RML, RLL which revealed a clear RUL no abnormal appearing growths/masses/tissue, no bleeding no significant secretions, examination of the BI and RML revealed significant amount of thick secretions which were suctioned and retrieved for culture, findings similar for RLL, bronchial washing obtained, otherwise RML and RLL displayed no abnormal appearing growths/masses/tissue, no bleeding. The bronchoscope was then withdrawn from ETT without incident. Pt remained hemodynamically stable throughout procedure.     Specimens: Sputum + Bronchial Washings Bronchoscope inserted through ETT. ETT noted to be in good position. Airway evaluation revealed Sharp Jg. Bronchoscope then advanced down into KELLY and LLL for further evaluation which revealed normal anatomic appearance of airways, no excessive secretions, no abnormal appearing growths/masses/tissue, no bleeding bronchial washing obtained, subsequently bronchoscope retracted to level of jg and then advanced into R RADHA for examination RUL, RML, RLL which revealed a clear RUL no abnormal appearing growths/masses/tissue, no bleeding no significant secretions, examination of the BI and RML/ LL revealed moderate amount of thick secretions which were suctioned and sent for culture, findings , bronchial washing obtained RLL, otherwise RML and RLL displayed no abnormal appearing growths/masses/tissue, no bleeding. The bronchoscope was then withdrawn from ETT without incident. Pt remained hemodynamically stable throughout procedure.     Specimens: Sputum + Bronchial Washings

## 2022-05-20 NOTE — CHART NOTE - NSCHARTNOTEFT_GEN_A_CORE
PRE-OP DIAGNOSIS:    Acute hypoxic respiratory failure   Organ donation    PROCEDURE:     [x] Coronary Angiogram   [x] LHC   [x] RHC            PHYSICIAN: Dr. Albrecht     ASSISTANT:  Dr. Cruz       PROCEDURE DESCRIPTION:     Consent:      [x] Umbrella consent for organ donation         Access & Closure:         [x] 5 Fr right Femoral Artery     [x] 7 Fr right Femoral Vein       IV Contrast: mL               FINDINGS:     Coronary Dominance:   Right    LM:   no disease     LAD:   no disease     CX:   no disease     RCA:   no disease     RHC  Pulmonary Artery (S/D/M) 33/17/23   Pulmonary Capillary Wedge 13/12/11   Right Atrium (a/v/M) 10/10/8   Right Ventricle (s/edp) 34/10/--   CO/CI 6.2/3.4         ESTIMATED BLOOD LOSS: < 10 mL        CONDITION:     [x] Good     [] Fair     [] Critical        SPECIMEN REMOVED: N/A       POST-OP DIAGNOSIS:      -Normal coronary anatomy   -Normal Cardiac output   -Normal PCWP       PLAN OF CARE:   Rest of the care pre the primary team and organ donation organization

## 2022-05-21 LAB
ALBUMIN SERPL ELPH-MCNC: 2.6 G/DL — LOW (ref 3.5–5.2)
ALBUMIN SERPL ELPH-MCNC: 2.8 G/DL — LOW (ref 3.5–5.2)
ALBUMIN SERPL ELPH-MCNC: 2.8 G/DL — LOW (ref 3.5–5.2)
ALP SERPL-CCNC: 136 U/L — HIGH (ref 30–115)
ALP SERPL-CCNC: 151 U/L — HIGH (ref 30–115)
ALP SERPL-CCNC: 158 U/L — HIGH (ref 30–115)
ALT FLD-CCNC: 26 U/L — SIGNIFICANT CHANGE UP (ref 0–41)
ALT FLD-CCNC: 26 U/L — SIGNIFICANT CHANGE UP (ref 0–41)
ALT FLD-CCNC: 28 U/L — SIGNIFICANT CHANGE UP (ref 0–41)
AMYLASE P1 CFR SERPL: 69 U/L — SIGNIFICANT CHANGE UP (ref 25–115)
AMYLASE P1 CFR SERPL: 75 U/L — SIGNIFICANT CHANGE UP (ref 25–115)
AMYLASE P1 CFR SERPL: 82 U/L — SIGNIFICANT CHANGE UP (ref 25–115)
AMYLASE P1 CFR SERPL: 82 U/L — SIGNIFICANT CHANGE UP (ref 25–115)
ANION GAP SERPL CALC-SCNC: 10 MMOL/L — SIGNIFICANT CHANGE UP (ref 7–14)
ANION GAP SERPL CALC-SCNC: 12 MMOL/L — SIGNIFICANT CHANGE UP (ref 7–14)
ANION GAP SERPL CALC-SCNC: 13 MMOL/L — SIGNIFICANT CHANGE UP (ref 7–14)
ANION GAP SERPL CALC-SCNC: 13 MMOL/L — SIGNIFICANT CHANGE UP (ref 7–14)
ANION GAP SERPL CALC-SCNC: 14 MMOL/L — SIGNIFICANT CHANGE UP (ref 7–14)
APPEARANCE UR: CLEAR — SIGNIFICANT CHANGE UP
APTT BLD: 26 SEC — LOW (ref 27–39.2)
APTT BLD: 26.3 SEC — LOW (ref 27–39.2)
APTT BLD: 26.7 SEC — LOW (ref 27–39.2)
APTT BLD: 28.2 SEC — SIGNIFICANT CHANGE UP (ref 27–39.2)
AST SERPL-CCNC: 17 U/L — SIGNIFICANT CHANGE UP (ref 0–41)
AST SERPL-CCNC: 18 U/L — SIGNIFICANT CHANGE UP (ref 0–41)
AST SERPL-CCNC: 18 U/L — SIGNIFICANT CHANGE UP (ref 0–41)
BACTERIA # UR AUTO: NEGATIVE — SIGNIFICANT CHANGE UP
BASE EXCESS BLDA CALC-SCNC: -2 MMOL/L — SIGNIFICANT CHANGE UP (ref -2–3)
BASE EXCESS BLDA CALC-SCNC: -3.2 MMOL/L — LOW (ref -2–3)
BASE EXCESS BLDA CALC-SCNC: -4.5 MMOL/L — LOW (ref -2–3)
BASOPHILS # BLD AUTO: 0.01 K/UL — SIGNIFICANT CHANGE UP (ref 0–0.2)
BASOPHILS # BLD AUTO: 0.01 K/UL — SIGNIFICANT CHANGE UP (ref 0–0.2)
BASOPHILS # BLD AUTO: 0.02 K/UL — SIGNIFICANT CHANGE UP (ref 0–0.2)
BASOPHILS # BLD AUTO: 0.02 K/UL — SIGNIFICANT CHANGE UP (ref 0–0.2)
BASOPHILS NFR BLD AUTO: 0.1 % — SIGNIFICANT CHANGE UP (ref 0–1)
BASOPHILS NFR BLD AUTO: 0.1 % — SIGNIFICANT CHANGE UP (ref 0–1)
BASOPHILS NFR BLD AUTO: 0.2 % — SIGNIFICANT CHANGE UP (ref 0–1)
BASOPHILS NFR BLD AUTO: 0.2 % — SIGNIFICANT CHANGE UP (ref 0–1)
BILIRUB DIRECT SERPL-MCNC: 0.2 MG/DL — SIGNIFICANT CHANGE UP (ref 0–0.3)
BILIRUB DIRECT SERPL-MCNC: 0.2 MG/DL — SIGNIFICANT CHANGE UP (ref 0–0.3)
BILIRUB INDIRECT FLD-MCNC: 0.3 MG/DL — SIGNIFICANT CHANGE UP (ref 0.2–1.2)
BILIRUB INDIRECT FLD-MCNC: 0.4 MG/DL — SIGNIFICANT CHANGE UP (ref 0.2–1.2)
BILIRUB SERPL-MCNC: 0.5 MG/DL — SIGNIFICANT CHANGE UP (ref 0.2–1.2)
BILIRUB SERPL-MCNC: 0.5 MG/DL — SIGNIFICANT CHANGE UP (ref 0.2–1.2)
BILIRUB SERPL-MCNC: 0.6 MG/DL — SIGNIFICANT CHANGE UP (ref 0.2–1.2)
BILIRUB UR-MCNC: NEGATIVE — SIGNIFICANT CHANGE UP
BUN SERPL-MCNC: 20 MG/DL — SIGNIFICANT CHANGE UP (ref 10–20)
BUN SERPL-MCNC: 21 MG/DL — HIGH (ref 10–20)
BUN SERPL-MCNC: 22 MG/DL — HIGH (ref 10–20)
BUN SERPL-MCNC: 22 MG/DL — HIGH (ref 10–20)
BUN SERPL-MCNC: 24 MG/DL — HIGH (ref 10–20)
CALCIUM SERPL-MCNC: 8.3 MG/DL — LOW (ref 8.5–10.1)
CALCIUM SERPL-MCNC: 8.7 MG/DL — SIGNIFICANT CHANGE UP (ref 8.5–10.1)
CALCIUM SERPL-MCNC: 8.8 MG/DL — SIGNIFICANT CHANGE UP (ref 8.5–10.1)
CHLORIDE SERPL-SCNC: 112 MMOL/L — HIGH (ref 98–110)
CHLORIDE SERPL-SCNC: 114 MMOL/L — HIGH (ref 98–110)
CHLORIDE SERPL-SCNC: 116 MMOL/L — HIGH (ref 98–110)
CHLORIDE SERPL-SCNC: 117 MMOL/L — HIGH (ref 98–110)
CHLORIDE SERPL-SCNC: 119 MMOL/L — HIGH (ref 98–110)
CK MB CFR SERPL CALC: 2.5 NG/ML — SIGNIFICANT CHANGE UP (ref 0.6–6.3)
CK MB CFR SERPL CALC: 3.1 NG/ML — SIGNIFICANT CHANGE UP (ref 0.6–6.3)
CK MB CFR SERPL CALC: 3.2 NG/ML — SIGNIFICANT CHANGE UP (ref 0.6–6.3)
CK MB CFR SERPL CALC: 3.3 NG/ML — SIGNIFICANT CHANGE UP (ref 0.6–6.3)
CK SERPL-CCNC: 135 U/L — SIGNIFICANT CHANGE UP (ref 0–225)
CK SERPL-CCNC: 151 U/L — SIGNIFICANT CHANGE UP (ref 0–225)
CK SERPL-CCNC: 155 U/L — SIGNIFICANT CHANGE UP (ref 0–225)
CK SERPL-CCNC: 166 U/L — SIGNIFICANT CHANGE UP (ref 0–225)
CO2 SERPL-SCNC: 17 MMOL/L — SIGNIFICANT CHANGE UP (ref 17–32)
CO2 SERPL-SCNC: 18 MMOL/L — SIGNIFICANT CHANGE UP (ref 17–32)
CO2 SERPL-SCNC: 19 MMOL/L — SIGNIFICANT CHANGE UP (ref 17–32)
CO2 SERPL-SCNC: 19 MMOL/L — SIGNIFICANT CHANGE UP (ref 17–32)
CO2 SERPL-SCNC: 20 MMOL/L — SIGNIFICANT CHANGE UP (ref 17–32)
COLOR SPEC: SIGNIFICANT CHANGE UP
CREAT SERPL-MCNC: 0.8 MG/DL — SIGNIFICANT CHANGE UP (ref 0.7–1.5)
CREAT SERPL-MCNC: 0.9 MG/DL — SIGNIFICANT CHANGE UP (ref 0.7–1.5)
CREAT SERPL-MCNC: 0.9 MG/DL — SIGNIFICANT CHANGE UP (ref 0.7–1.5)
DIFF PNL FLD: ABNORMAL
EGFR: 100 ML/MIN/1.73M2 — SIGNIFICANT CHANGE UP
EGFR: 87 ML/MIN/1.73M2 — SIGNIFICANT CHANGE UP
EGFR: 87 ML/MIN/1.73M2 — SIGNIFICANT CHANGE UP
EOSINOPHIL # BLD AUTO: 0 K/UL — SIGNIFICANT CHANGE UP (ref 0–0.7)
EOSINOPHIL NFR BLD AUTO: 0 % — SIGNIFICANT CHANGE UP (ref 0–8)
EPI CELLS # UR: 1 /HPF — SIGNIFICANT CHANGE UP (ref 0–5)
FIBRINOGEN PPP-MCNC: >700 MG/DL — HIGH (ref 204.4–570.6)
GLUCOSE SERPL-MCNC: 378 MG/DL — HIGH (ref 70–99)
GLUCOSE SERPL-MCNC: 388 MG/DL — HIGH (ref 70–99)
GLUCOSE SERPL-MCNC: 392 MG/DL — HIGH (ref 70–99)
GLUCOSE SERPL-MCNC: 429 MG/DL — HIGH (ref 70–99)
GLUCOSE SERPL-MCNC: 454 MG/DL — CRITICAL HIGH (ref 70–99)
GLUCOSE UR QL: ABNORMAL
HCO3 BLDA-SCNC: 19 MMOL/L — LOW (ref 21–28)
HCO3 BLDA-SCNC: 19 MMOL/L — LOW (ref 21–28)
HCO3 BLDA-SCNC: 20 MMOL/L — LOW (ref 21–28)
HCT VFR BLD CALC: 29.7 % — LOW (ref 37–47)
HCT VFR BLD CALC: 30 % — LOW (ref 37–47)
HCT VFR BLD CALC: 30 % — LOW (ref 37–47)
HCT VFR BLD CALC: 30.2 % — LOW (ref 37–47)
HGB BLD-MCNC: 10 G/DL — LOW (ref 12–16)
HGB BLD-MCNC: 10.1 G/DL — LOW (ref 12–16)
HGB BLD-MCNC: 10.1 G/DL — LOW (ref 12–16)
HGB BLD-MCNC: 10.2 G/DL — LOW (ref 12–16)
HOROWITZ INDEX BLDA+IHG-RTO: 100 — SIGNIFICANT CHANGE UP
HOROWITZ INDEX BLDA+IHG-RTO: 100 — SIGNIFICANT CHANGE UP
HOROWITZ INDEX BLDA+IHG-RTO: 50 — SIGNIFICANT CHANGE UP
HYALINE CASTS # UR AUTO: 2 /LPF — SIGNIFICANT CHANGE UP (ref 0–7)
IMM GRANULOCYTES NFR BLD AUTO: 0.6 % — HIGH (ref 0.1–0.3)
IMM GRANULOCYTES NFR BLD AUTO: 0.6 % — HIGH (ref 0.1–0.3)
IMM GRANULOCYTES NFR BLD AUTO: 0.7 % — HIGH (ref 0.1–0.3)
IMM GRANULOCYTES NFR BLD AUTO: 0.8 % — HIGH (ref 0.1–0.3)
INR BLD: 1.34 RATIO — HIGH (ref 0.65–1.3)
INR BLD: 1.4 RATIO — HIGH (ref 0.65–1.3)
INR BLD: 1.4 RATIO — HIGH (ref 0.65–1.3)
INR BLD: 1.45 RATIO — HIGH (ref 0.65–1.3)
KETONES UR-MCNC: ABNORMAL
LDH SERPL L TO P-CCNC: 664 — HIGH (ref 50–242)
LDH SERPL L TO P-CCNC: 723 — HIGH (ref 50–242)
LDH SERPL L TO P-CCNC: 739 — HIGH (ref 50–242)
LDH SERPL L TO P-CCNC: 778 — HIGH (ref 50–242)
LEUKOCYTE ESTERASE UR-ACNC: NEGATIVE — SIGNIFICANT CHANGE UP
LIDOCAIN IGE QN: 22 U/L — SIGNIFICANT CHANGE UP (ref 7–60)
LIDOCAIN IGE QN: 22 U/L — SIGNIFICANT CHANGE UP (ref 7–60)
LIDOCAIN IGE QN: 32 U/L — SIGNIFICANT CHANGE UP (ref 7–60)
LIDOCAIN IGE QN: 35 U/L — SIGNIFICANT CHANGE UP (ref 7–60)
LYMPHOCYTES # BLD AUTO: 0.91 K/UL — LOW (ref 1.2–3.4)
LYMPHOCYTES # BLD AUTO: 1.05 K/UL — LOW (ref 1.2–3.4)
LYMPHOCYTES # BLD AUTO: 1.09 K/UL — LOW (ref 1.2–3.4)
LYMPHOCYTES # BLD AUTO: 1.14 K/UL — LOW (ref 1.2–3.4)
LYMPHOCYTES # BLD AUTO: 10 % — LOW (ref 20.5–51.1)
LYMPHOCYTES # BLD AUTO: 10.9 % — LOW (ref 20.5–51.1)
LYMPHOCYTES # BLD AUTO: 9.3 % — LOW (ref 20.5–51.1)
LYMPHOCYTES # BLD AUTO: 9.4 % — LOW (ref 20.5–51.1)
MAGNESIUM SERPL-MCNC: 2.7 MG/DL — HIGH (ref 1.8–2.4)
MAGNESIUM SERPL-MCNC: 2.8 MG/DL — HIGH (ref 1.8–2.4)
MAGNESIUM SERPL-MCNC: 3 MG/DL — HIGH (ref 1.8–2.4)
MAGNESIUM SERPL-MCNC: 3.1 MG/DL — CRITICAL HIGH (ref 1.8–2.4)
MCHC RBC-ENTMCNC: 28.4 PG — SIGNIFICANT CHANGE UP (ref 27–31)
MCHC RBC-ENTMCNC: 28.5 PG — SIGNIFICANT CHANGE UP (ref 27–31)
MCHC RBC-ENTMCNC: 28.8 PG — SIGNIFICANT CHANGE UP (ref 27–31)
MCHC RBC-ENTMCNC: 28.9 PG — SIGNIFICANT CHANGE UP (ref 27–31)
MCHC RBC-ENTMCNC: 33.4 G/DL — SIGNIFICANT CHANGE UP (ref 32–37)
MCHC RBC-ENTMCNC: 33.7 G/DL — SIGNIFICANT CHANGE UP (ref 32–37)
MCHC RBC-ENTMCNC: 33.7 G/DL — SIGNIFICANT CHANGE UP (ref 32–37)
MCHC RBC-ENTMCNC: 34 G/DL — SIGNIFICANT CHANGE UP (ref 32–37)
MCV RBC AUTO: 84.4 FL — SIGNIFICANT CHANGE UP (ref 81–99)
MCV RBC AUTO: 84.7 FL — SIGNIFICANT CHANGE UP (ref 81–99)
MCV RBC AUTO: 84.7 FL — SIGNIFICANT CHANGE UP (ref 81–99)
MCV RBC AUTO: 86.5 FL — SIGNIFICANT CHANGE UP (ref 81–99)
MONOCYTES # BLD AUTO: 0.34 K/UL — SIGNIFICANT CHANGE UP (ref 0.1–0.6)
MONOCYTES # BLD AUTO: 0.4 K/UL — SIGNIFICANT CHANGE UP (ref 0.1–0.6)
MONOCYTES # BLD AUTO: 0.43 K/UL — SIGNIFICANT CHANGE UP (ref 0.1–0.6)
MONOCYTES # BLD AUTO: 0.51 K/UL — SIGNIFICANT CHANGE UP (ref 0.1–0.6)
MONOCYTES NFR BLD AUTO: 3 % — SIGNIFICANT CHANGE UP (ref 1.7–9.3)
MONOCYTES NFR BLD AUTO: 3.8 % — SIGNIFICANT CHANGE UP (ref 1.7–9.3)
MONOCYTES NFR BLD AUTO: 4.4 % — SIGNIFICANT CHANGE UP (ref 1.7–9.3)
MONOCYTES NFR BLD AUTO: 4.7 % — SIGNIFICANT CHANGE UP (ref 1.7–9.3)
NEUTROPHILS # BLD AUTO: 8.24 K/UL — HIGH (ref 1.4–6.5)
NEUTROPHILS # BLD AUTO: 8.84 K/UL — HIGH (ref 1.4–6.5)
NEUTROPHILS # BLD AUTO: 9.21 K/UL — HIGH (ref 1.4–6.5)
NEUTROPHILS # BLD AUTO: 9.77 K/UL — HIGH (ref 1.4–6.5)
NEUTROPHILS NFR BLD AUTO: 84.4 % — HIGH (ref 42.2–75.2)
NEUTROPHILS NFR BLD AUTO: 84.5 % — HIGH (ref 42.2–75.2)
NEUTROPHILS NFR BLD AUTO: 85.3 % — HIGH (ref 42.2–75.2)
NEUTROPHILS NFR BLD AUTO: 87 % — HIGH (ref 42.2–75.2)
NITRITE UR-MCNC: NEGATIVE — SIGNIFICANT CHANGE UP
NRBC # BLD: 0 /100 WBCS — SIGNIFICANT CHANGE UP (ref 0–0)
OSMOLALITY UR: 792 MOS/KG — SIGNIFICANT CHANGE UP (ref 50–1200)
PCO2 BLDA: 26 MMHG — SIGNIFICANT CHANGE UP (ref 25–48)
PCO2 BLDA: 28 MMHG — SIGNIFICANT CHANGE UP (ref 25–48)
PCO2 BLDA: 31 MMHG — SIGNIFICANT CHANGE UP (ref 25–48)
PH BLDA: 7.4 — SIGNIFICANT CHANGE UP (ref 7.35–7.45)
PH BLDA: 7.47 — HIGH (ref 7.35–7.45)
PH BLDA: 7.47 — HIGH (ref 7.35–7.45)
PH UR: 6 — SIGNIFICANT CHANGE UP (ref 5–8)
PHOSPHATE SERPL-MCNC: 2.6 MG/DL — SIGNIFICANT CHANGE UP (ref 2.1–4.9)
PHOSPHATE SERPL-MCNC: 2.7 MG/DL — SIGNIFICANT CHANGE UP (ref 2.1–4.9)
PHOSPHATE SERPL-MCNC: 3.1 MG/DL — SIGNIFICANT CHANGE UP (ref 2.1–4.9)
PHOSPHATE SERPL-MCNC: 3.3 MG/DL — SIGNIFICANT CHANGE UP (ref 2.1–4.9)
PLATELET # BLD AUTO: 100 K/UL — LOW (ref 130–400)
PLATELET # BLD AUTO: 69 K/UL — LOW (ref 130–400)
PLATELET # BLD AUTO: 87 K/UL — LOW (ref 130–400)
PLATELET # BLD AUTO: 89 K/UL — LOW (ref 130–400)
PO2 BLDA: 174 MMHG — HIGH (ref 83–108)
PO2 BLDA: 338 MMHG — HIGH (ref 83–108)
PO2 BLDA: 350 MMHG — HIGH (ref 83–108)
POTASSIUM SERPL-MCNC: 3.3 MMOL/L — LOW (ref 3.5–5)
POTASSIUM SERPL-MCNC: 3.7 MMOL/L — SIGNIFICANT CHANGE UP (ref 3.5–5)
POTASSIUM SERPL-MCNC: 3.8 MMOL/L — SIGNIFICANT CHANGE UP (ref 3.5–5)
POTASSIUM SERPL-SCNC: 3.3 MMOL/L — LOW (ref 3.5–5)
POTASSIUM SERPL-SCNC: 3.7 MMOL/L — SIGNIFICANT CHANGE UP (ref 3.5–5)
POTASSIUM SERPL-SCNC: 3.8 MMOL/L — SIGNIFICANT CHANGE UP (ref 3.5–5)
PROT SERPL-MCNC: 5.3 G/DL — LOW (ref 6–8)
PROT SERPL-MCNC: 5.6 G/DL — LOW (ref 6–8)
PROT SERPL-MCNC: 5.8 G/DL — LOW (ref 6–8)
PROT UR-MCNC: ABNORMAL
PROTHROM AB SERPL-ACNC: 15.4 SEC — HIGH (ref 9.95–12.87)
PROTHROM AB SERPL-ACNC: 16 SEC — HIGH (ref 9.95–12.87)
PROTHROM AB SERPL-ACNC: 16 SEC — HIGH (ref 9.95–12.87)
PROTHROM AB SERPL-ACNC: 16.6 SEC — HIGH (ref 9.95–12.87)
RBC # BLD: 3.49 M/UL — LOW (ref 4.2–5.4)
RBC # BLD: 3.52 M/UL — LOW (ref 4.2–5.4)
RBC # BLD: 3.54 M/UL — LOW (ref 4.2–5.4)
RBC # BLD: 3.54 M/UL — LOW (ref 4.2–5.4)
RBC # FLD: 15.1 % — HIGH (ref 11.5–14.5)
RBC # FLD: 15.2 % — HIGH (ref 11.5–14.5)
RBC CASTS # UR COMP ASSIST: 4 /HPF — SIGNIFICANT CHANGE UP (ref 0–4)
SAO2 % BLDA: 99.2 % — HIGH (ref 94–98)
SAO2 % BLDA: 99.3 % — HIGH (ref 94–98)
SAO2 % BLDA: 99.4 % — HIGH (ref 94–98)
SODIUM SERPL-SCNC: 143 MMOL/L — SIGNIFICANT CHANGE UP (ref 135–146)
SODIUM SERPL-SCNC: 145 MMOL/L — SIGNIFICANT CHANGE UP (ref 135–146)
SODIUM SERPL-SCNC: 147 MMOL/L — HIGH (ref 135–146)
SODIUM SERPL-SCNC: 149 MMOL/L — HIGH (ref 135–146)
SODIUM SERPL-SCNC: 149 MMOL/L — HIGH (ref 135–146)
SP GR SPEC: 1.04 — HIGH (ref 1.01–1.03)
TROPONIN T SERPL-MCNC: <0.01 NG/ML — SIGNIFICANT CHANGE UP
UROBILINOGEN FLD QL: SIGNIFICANT CHANGE UP
WBC # BLD: 10.47 K/UL — SIGNIFICANT CHANGE UP (ref 4.8–10.8)
WBC # BLD: 10.89 K/UL — HIGH (ref 4.8–10.8)
WBC # BLD: 11.24 K/UL — HIGH (ref 4.8–10.8)
WBC # BLD: 9.67 K/UL — SIGNIFICANT CHANGE UP (ref 4.8–10.8)
WBC # FLD AUTO: 10.47 K/UL — SIGNIFICANT CHANGE UP (ref 4.8–10.8)
WBC # FLD AUTO: 10.89 K/UL — HIGH (ref 4.8–10.8)
WBC # FLD AUTO: 11.24 K/UL — HIGH (ref 4.8–10.8)
WBC # FLD AUTO: 9.67 K/UL — SIGNIFICANT CHANGE UP (ref 4.8–10.8)
WBC UR QL: 1 /HPF — SIGNIFICANT CHANGE UP (ref 0–5)

## 2022-05-21 PROCEDURE — 71045 X-RAY EXAM CHEST 1 VIEW: CPT | Mod: 26,77

## 2022-05-21 PROCEDURE — 71045 X-RAY EXAM CHEST 1 VIEW: CPT | Mod: 26

## 2022-05-21 PROCEDURE — 76700 US EXAM ABDOM COMPLETE: CPT | Mod: 26

## 2022-05-21 RX ORDER — VASOPRESSIN 20 [USP'U]/ML
1.2 INJECTION INTRAVENOUS
Qty: 50 | Refills: 0 | Status: DISCONTINUED | OUTPATIENT
Start: 2022-05-21 | End: 2022-05-22

## 2022-05-21 RX ORDER — POTASSIUM CHLORIDE 20 MEQ
20 PACKET (EA) ORAL ONCE
Refills: 0 | Status: COMPLETED | OUTPATIENT
Start: 2022-05-21 | End: 2022-05-21

## 2022-05-21 RX ADMIN — PANTOPRAZOLE SODIUM 10 MG/HR: 20 TABLET, DELAYED RELEASE ORAL at 23:41

## 2022-05-21 RX ADMIN — PIPERACILLIN AND TAZOBACTAM 25 GRAM(S): 4; .5 INJECTION, POWDER, LYOPHILIZED, FOR SOLUTION INTRAVENOUS at 23:41

## 2022-05-21 RX ADMIN — PIPERACILLIN AND TAZOBACTAM 25 GRAM(S): 4; .5 INJECTION, POWDER, LYOPHILIZED, FOR SOLUTION INTRAVENOUS at 17:49

## 2022-05-21 RX ADMIN — Medication 25 MICROGRAM(S)/HR: at 23:41

## 2022-05-21 RX ADMIN — PIPERACILLIN AND TAZOBACTAM 25 GRAM(S): 4; .5 INJECTION, POWDER, LYOPHILIZED, FOR SOLUTION INTRAVENOUS at 05:03

## 2022-05-21 RX ADMIN — PIPERACILLIN AND TAZOBACTAM 25 GRAM(S): 4; .5 INJECTION, POWDER, LYOPHILIZED, FOR SOLUTION INTRAVENOUS at 13:00

## 2022-05-21 RX ADMIN — Medication 58 MILLIGRAM(S): at 03:09

## 2022-05-21 RX ADMIN — Medication 100 MILLIEQUIVALENT(S): at 06:26

## 2022-05-21 RX ADMIN — ALBUTEROL 2 PUFF(S): 90 AEROSOL, METERED ORAL at 19:44

## 2022-05-21 NOTE — CHART NOTE - NSCHARTNOTESELECT_GEN_ALL_CORE
Contact Info/Event Note
Post Cardiac cath note
update in plan/Event Note
RD Follow-Up/Event Note
possible contact/Event Note

## 2022-05-21 NOTE — CHART NOTE - NSCHARTNOTEFT_GEN_A_CORE
Registered Dietitian Follow-Up     Patient Profile Reviewed                           Yes []   No []     Nutrition History Previously Obtained        Yes []  No []       Pertinent Subjective Information:     Pertinent Medical Interventions:     Diet order:     Anthropometrics:  Height (cm): 165.1 (05-15-22 @ 12:30)  Weight (kg): 75.5 (05-15-22 @ 12:30)  BMI (kg/m2): 27.7 (05-15-22 @ 12:30)  IBW:     Daily Weight in k.1 (-21), Weight in k.9 (-20), Weight in k.9 (-19), Weight in k.2 (-18), Weight in k.4 (-17), Weight in k.9 (-16)  % Weight Change    MEDICATIONS  (STANDING):  ALBUTerol    90 MICROgram(s) HFA Inhaler 2 Puff(s) Inhalation every 6 hours  levothyroxine Infusion 10 MICROgram(s)/Hr (25 mL/Hr) IV Continuous <Continuous>  methylPREDNISolone sodium succinate IVPB 1000 milliGRAM(s) IV Intermittent every 24 hours  norepinephrine Infusion 0.05 MICROgram(s)/kG/Min (7.5 mL/Hr) IV Continuous <Continuous>  pantoprazole Infusion 8 mG/Hr (10 mL/Hr) IV Continuous <Continuous>  piperacillin/tazobactam IVPB.. 3.375 Gram(s) IV Intermittent every 6 hours  vasopressin Infusion. 1.2 Unit(s)/Hr (1.2 mL/Hr) IV Continuous <Continuous>    MEDICATIONS  (PRN):    Pertinent Labs:  @ 19:05: Na 149<H>, BUN 22<H>, Cr 0.9, <H>, K+ 3.7, Phos --, Mg --, Alk Phos 151<H>, ALT/SGPT 26, AST/SGOT 18, HbA1c --   @ 16:30: Na 147<H>, BUN 21<H>, Cr 0.8, <H>, K+ 3.7, Phos 2.7, Mg 3.0<H>, Alk Phos --, ALT/SGPT --, AST/SGOT --, HbA1c --   @ 11:00: Na 143, BUN 22<H>, Cr 0.8, <HH>, K+ 3.7, Phos 3.3, Mg 2.8<H>, Alk Phos --, ALT/SGPT --, AST/SGOT --, HbA1c --   @ 04:27: Na 145, BUN 20, Cr 0.8, <H>, K+ 3.3<L>, Phos 3.1, Mg 2.7<H>, Alk Phos 158<H>, ALT/SGPT 28, AST/SGOT 18, HbA1c --   @ 22:08: Na 150<H>, BUN 21<H>, Cr 0.9, <H>, K+ 3.5, Phos 3.9, Mg 2.6<H>, Alk Phos 142<H>, ALT/SGPT 30, AST/SGOT 19, HbA1c --    Finger Sticks:    Physical Findings:  - Appearance:  - GI function:  - Tubes:  - Oral/Mouth cavity:  - Skin:     Nutrition Requirements:  Weight Used:     Estimated Energy Needs    Continue []  Adjust []  Adjusted Energy Recommendations:   kcal/day        Estimated Protein Needs    Continue []  Adjust []  Adjusted Protein Recommendations:   gm/day        Estimated Fluid Needs        Continue []  Adjust []  Adjusted Fluid Recommendations:   mL/day     Nutrient Intake:     [] Previous Nutrition Diagnosis:            [] Ongoing          [] Resolved    [] No active nutrition diagnosis identified at this time     Nutrition Intervention      Goal/Expected Outcome:      Indicator/Monitoring:      Recommendation: Registered Dietitian Follow-Up     Patient Profile Reviewed                           Yes [x]   No []     Nutrition History Previously Obtained        Yes []  No [x]       Pertinent Subjective Information: Unable to obtain nutrition hx at this time in setting of critical illness.     Pertinent Medical Interventions: Pt presents to ED in ROSC s/p cardiac arrest. Acute hypoxemic respiratory failure secondary to suspected drug overdose s/p cardiac arrest. Intubated at this time. HAGMA 2/2 lactic acidosis. Aspiration PNA noted. Hypernatremia noted secondary to possible CDI. Per  care coordination notes: pt is with no brainstem activity, brain death protocol will be initiated when normothermic. Tmax 24 hours 36.2 C. VE 8.     Diet order: NPO at this time    Anthropometrics:  Height (cm): 165.1 (05-15-22 @ 12:30)  Weight (kg): 75.5 (05-15-22 @ 12:30)  BMI (kg/m2): 27.7 (05-15-22 @ 12:30)  IBW: 56.8 kg.    Daily Weight in k.1 (-21), Weight in k.9 (-20), Weight in k.9 (-19), Weight in k.2 (-18), Weight in k.4 (-17), Weight in k.9 (-16)    MEDICATIONS  (STANDING):  ALBUTerol    90 MICROgram(s) HFA Inhaler 2 Puff(s) Inhalation every 6 hours  levothyroxine Infusion 10 MICROgram(s)/Hr (25 mL/Hr) IV Continuous <Continuous>  methylPREDNISolone sodium succinate IVPB 1000 milliGRAM(s) IV Intermittent every 24 hours  norepinephrine Infusion 0.05 MICROgram(s)/kG/Min (7.5 mL/Hr) IV Continuous <Continuous>  pantoprazole Infusion 8 mG/Hr (10 mL/Hr) IV Continuous <Continuous>  piperacillin/tazobactam IVPB.. 3.375 Gram(s) IV Intermittent every 6 hours  vasopressin Infusion. 1.2 Unit(s)/Hr (1.2 mL/Hr) IV Continuous <Continuous>    Pertinent Labs:  @ 19:05: Na 149<H>, BUN 22<H>, Cr 0.9, <H>, K+ 3.7, Phos --, Mg --, Alk Phos 151<H>, ALT/SGPT 26, AST/SGOT 18   @ 16:30: Na 147<H>, BUN 21<H>, Cr 0.8, <H>, K+ 3.7, Phos 2.7, Mg 3.0<H>   @ 11:00: Na 143, BUN 22<H>, Cr 0.8, <HH>, K+ 3.7, Phos 3.3, Mg 2.8<H>   @ 04:27: Na 145, BUN 20, Cr 0.8, <H>, K+ 3.3<L>, Phos 3.1, Mg 2.7<H>, Alk Phos 158<H>, ALT/SGPT 28, AST/SGOT 18   @ 22:08: Na 150<H>, BUN 21<H>, Cr 0.9, <H>, K+ 3.5, Phos 3.9, Mg 2.6<H>, Alk Phos 142<H>, ALT/SGPT 30, AST/SGOT 19    Physical Findings:  - Appearance: intubated; unresponsive; no edema noted  - GI function: last BM date unknown; last noted BM 5/15; LIP notified  - Tubes: OG tube  - Oral/Mouth cavity: NPO  - Skin: ecchymotic     Nutrition Requirements:  Weight Used: 76 kg     Estimated Energy Needs    Continue [x]  Adjust []  912-1900 kcal/day (12-25 kcal/kg ABW)        Estimated Protein Needs    Continue [x]  Adjust []   g/day (1.2-1.5 g/kg ABW)        Estimated Fluid Needs        Continue [x]  Adjust []  2280 mL/day (30 mL/kg ABW)     Nutrient Intake: Currently NPO; not receiving nutrition.     [x] Previous Nutrition Diagnosis: Inadequate Oral Intake            [x] Ongoing          [] Resolved    Nutrition Intervention: Enteral Nutrition     Goal/Expected Outcome: Nutrition regimen determined; pt to meet >85% & <105% of estimated nutrient needs over next 4 days.     Indicator/Monitoring: Energy intake, labs, skin, BM, wt, diet.    Recommendation: If hemodynamically stable enough to receive EN, and if consistent with goals of care would initiate trickle feeds of Peptamen AF at 10-20 mL/hr. If able to advance to higher rate, would provide goal rate 50 mL/hr. Regimen at goal to provide 1440 kcal, 90 g protein, 972 mL free H2O. Monitor Mg/PO4/K; replete as needed.

## 2022-05-22 VITALS — RESPIRATION RATE: 15 BRPM | HEART RATE: 90 BPM | OXYGEN SATURATION: 100 %

## 2022-05-22 LAB
AMYLASE P1 CFR SERPL: 82 U/L — SIGNIFICANT CHANGE UP (ref 25–115)
ANION GAP SERPL CALC-SCNC: 11 MMOL/L — SIGNIFICANT CHANGE UP (ref 7–14)
APPEARANCE UR: ABNORMAL
APTT BLD: 25.4 SEC — LOW (ref 27–39.2)
BACTERIA # UR AUTO: NEGATIVE — SIGNIFICANT CHANGE UP
BASOPHILS # BLD AUTO: 0.02 K/UL — SIGNIFICANT CHANGE UP (ref 0–0.2)
BASOPHILS NFR BLD AUTO: 0.2 % — SIGNIFICANT CHANGE UP (ref 0–1)
BILIRUB UR-MCNC: NEGATIVE — SIGNIFICANT CHANGE UP
BUN SERPL-MCNC: 29 MG/DL — HIGH (ref 10–20)
CALCIUM SERPL-MCNC: 8.6 MG/DL — SIGNIFICANT CHANGE UP (ref 8.5–10.1)
CHLORIDE SERPL-SCNC: 119 MMOL/L — HIGH (ref 98–110)
CK MB CFR SERPL CALC: 2.1 NG/ML — SIGNIFICANT CHANGE UP (ref 0.6–6.3)
CK SERPL-CCNC: 107 U/L — SIGNIFICANT CHANGE UP (ref 0–225)
CO2 SERPL-SCNC: 21 MMOL/L — SIGNIFICANT CHANGE UP (ref 17–32)
COLOR SPEC: SIGNIFICANT CHANGE UP
CREAT SERPL-MCNC: 0.8 MG/DL — SIGNIFICANT CHANGE UP (ref 0.7–1.5)
DIFF PNL FLD: ABNORMAL
EGFR: 100 ML/MIN/1.73M2 — SIGNIFICANT CHANGE UP
EOSINOPHIL # BLD AUTO: 0 K/UL — SIGNIFICANT CHANGE UP (ref 0–0.7)
EOSINOPHIL NFR BLD AUTO: 0 % — SIGNIFICANT CHANGE UP (ref 0–8)
EPI CELLS # UR: 1 /HPF — SIGNIFICANT CHANGE UP (ref 0–5)
FIBRINOGEN PPP-MCNC: >700 MG/DL — HIGH (ref 204.4–570.6)
GLUCOSE BLDC GLUCOMTR-MCNC: 296 MG/DL — HIGH (ref 70–99)
GLUCOSE BLDC GLUCOMTR-MCNC: 300 MG/DL — HIGH (ref 70–99)
GLUCOSE BLDC GLUCOMTR-MCNC: 353 MG/DL — HIGH (ref 70–99)
GLUCOSE SERPL-MCNC: 323 MG/DL — HIGH (ref 70–99)
GLUCOSE UR QL: ABNORMAL
HCT VFR BLD CALC: 28 % — LOW (ref 37–47)
HGB BLD-MCNC: 9.3 G/DL — LOW (ref 12–16)
HYALINE CASTS # UR AUTO: 1 /LPF — SIGNIFICANT CHANGE UP (ref 0–7)
IMM GRANULOCYTES NFR BLD AUTO: 1 % — HIGH (ref 0.1–0.3)
INR BLD: 1.45 RATIO — HIGH (ref 0.65–1.3)
KETONES UR-MCNC: SIGNIFICANT CHANGE UP
LDH SERPL L TO P-CCNC: 732 — HIGH (ref 50–242)
LEUKOCYTE ESTERASE UR-ACNC: NEGATIVE — SIGNIFICANT CHANGE UP
LIDOCAIN IGE QN: 56 U/L — SIGNIFICANT CHANGE UP (ref 7–60)
LYMPHOCYTES # BLD AUTO: 1.03 K/UL — LOW (ref 1.2–3.4)
LYMPHOCYTES # BLD AUTO: 10.6 % — LOW (ref 20.5–51.1)
MAGNESIUM SERPL-MCNC: 3 MG/DL — HIGH (ref 1.8–2.4)
MCHC RBC-ENTMCNC: 28.4 PG — SIGNIFICANT CHANGE UP (ref 27–31)
MCHC RBC-ENTMCNC: 33.2 G/DL — SIGNIFICANT CHANGE UP (ref 32–37)
MCV RBC AUTO: 85.6 FL — SIGNIFICANT CHANGE UP (ref 81–99)
MONOCYTES # BLD AUTO: 0.57 K/UL — SIGNIFICANT CHANGE UP (ref 0.1–0.6)
MONOCYTES NFR BLD AUTO: 5.8 % — SIGNIFICANT CHANGE UP (ref 1.7–9.3)
NEUTROPHILS # BLD AUTO: 8.03 K/UL — HIGH (ref 1.4–6.5)
NEUTROPHILS NFR BLD AUTO: 82.4 % — HIGH (ref 42.2–75.2)
NITRITE UR-MCNC: NEGATIVE — SIGNIFICANT CHANGE UP
NRBC # BLD: 0 /100 WBCS — SIGNIFICANT CHANGE UP (ref 0–0)
OSMOLALITY UR: 972 MOS/KG — SIGNIFICANT CHANGE UP (ref 50–1200)
PH UR: 6 — SIGNIFICANT CHANGE UP (ref 5–8)
PHOSPHATE SERPL-MCNC: 2.4 MG/DL — SIGNIFICANT CHANGE UP (ref 2.1–4.9)
PLATELET # BLD AUTO: 111 K/UL — LOW (ref 130–400)
POTASSIUM SERPL-MCNC: 3.8 MMOL/L — SIGNIFICANT CHANGE UP (ref 3.5–5)
POTASSIUM SERPL-SCNC: 3.8 MMOL/L — SIGNIFICANT CHANGE UP (ref 3.5–5)
PROT UR-MCNC: ABNORMAL
PROTHROM AB SERPL-ACNC: 16.6 SEC — HIGH (ref 9.95–12.87)
RBC # BLD: 3.27 M/UL — LOW (ref 4.2–5.4)
RBC # FLD: 15.4 % — HIGH (ref 11.5–14.5)
RBC CASTS # UR COMP ASSIST: 1 /HPF — SIGNIFICANT CHANGE UP (ref 0–4)
SODIUM SERPL-SCNC: 151 MMOL/L — HIGH (ref 135–146)
SP GR SPEC: 1.04 — HIGH (ref 1.01–1.03)
TROPONIN T SERPL-MCNC: <0.01 NG/ML — SIGNIFICANT CHANGE UP
UROBILINOGEN FLD QL: SIGNIFICANT CHANGE UP
WBC # BLD: 9.75 K/UL — SIGNIFICANT CHANGE UP (ref 4.8–10.8)
WBC # FLD AUTO: 9.75 K/UL — SIGNIFICANT CHANGE UP (ref 4.8–10.8)
WBC UR QL: 0 /HPF — SIGNIFICANT CHANGE UP (ref 0–5)

## 2022-05-22 PROCEDURE — 71045 X-RAY EXAM CHEST 1 VIEW: CPT | Mod: 26

## 2022-05-22 RX ORDER — DEXTROSE 50 % IN WATER 50 %
25 SYRINGE (ML) INTRAVENOUS ONCE
Refills: 0 | Status: DISCONTINUED | OUTPATIENT
Start: 2022-05-22 | End: 2022-01-01

## 2022-05-22 RX ORDER — VASOPRESSIN 20 [USP'U]/ML
0.5 INJECTION INTRAVENOUS
Qty: 50 | Refills: 0 | Status: DISCONTINUED | OUTPATIENT
Start: 2022-05-22 | End: 2022-05-22

## 2022-05-22 RX ORDER — INSULIN LISPRO 100/ML
6 VIAL (ML) SUBCUTANEOUS ONCE
Refills: 0 | Status: COMPLETED | OUTPATIENT
Start: 2022-05-22 | End: 2022-05-22

## 2022-05-22 RX ORDER — INSULIN LISPRO 100/ML
VIAL (ML) SUBCUTANEOUS EVERY 6 HOURS
Refills: 0 | Status: DISCONTINUED | OUTPATIENT
Start: 2022-05-22 | End: 2022-01-01

## 2022-05-22 RX ORDER — INSULIN LISPRO 100/ML
VIAL (ML) SUBCUTANEOUS AT BEDTIME
Refills: 0 | Status: DISCONTINUED | OUTPATIENT
Start: 2022-05-22 | End: 2022-01-01

## 2022-05-22 RX ORDER — GLUCAGON INJECTION, SOLUTION 0.5 MG/.1ML
1 INJECTION, SOLUTION SUBCUTANEOUS ONCE
Refills: 0 | Status: DISCONTINUED | OUTPATIENT
Start: 2022-05-22 | End: 2022-01-01

## 2022-05-22 RX ORDER — SODIUM CHLORIDE 9 MG/ML
1000 INJECTION, SOLUTION INTRAVENOUS
Refills: 0 | Status: DISCONTINUED | OUTPATIENT
Start: 2022-05-22 | End: 2022-01-01

## 2022-05-22 RX ORDER — DEXTROSE 50 % IN WATER 50 %
12.5 SYRINGE (ML) INTRAVENOUS ONCE
Refills: 0 | Status: DISCONTINUED | OUTPATIENT
Start: 2022-05-22 | End: 2022-01-01

## 2022-05-22 RX ORDER — DEXTROSE 50 % IN WATER 50 %
15 SYRINGE (ML) INTRAVENOUS ONCE
Refills: 0 | Status: DISCONTINUED | OUTPATIENT
Start: 2022-05-22 | End: 2022-01-01

## 2022-05-22 RX ADMIN — Medication 58 MILLIGRAM(S): at 02:38

## 2022-05-22 RX ADMIN — Medication 6 UNIT(S): at 01:15

## 2022-05-22 RX ADMIN — ALBUTEROL 2 PUFF(S): 90 AEROSOL, METERED ORAL at 08:54

## 2022-05-22 RX ADMIN — PIPERACILLIN AND TAZOBACTAM 25 GRAM(S): 4; .5 INJECTION, POWDER, LYOPHILIZED, FOR SOLUTION INTRAVENOUS at 05:52

## 2022-05-22 RX ADMIN — ALBUTEROL 2 PUFF(S): 90 AEROSOL, METERED ORAL at 02:19

## 2022-05-22 RX ADMIN — Medication 2: at 05:52

## 2022-07-11 NOTE — ED ADULT TRIAGE NOTE - HEART RATE (BEATS/MIN)
Pharmacy Vancomycin Initial Note  Date of Service 2022  Patient's  1956  66 year old, female    Indication: Sepsis    Current estimated CrCl = Estimated Creatinine Clearance: 61.8 mL/min (based on SCr of 0.83 mg/dL).    Creatinine for last 3 days  7/10/2022: 10:33 PM Creatinine 0.83 mg/dL    Recent Vancomycin Level(s) for last 3 days  No results found for requested labs within last 72 hours.      Vancomycin IV Administrations (past 72 hours)      No vancomycin orders with administrations in past 72 hours.                Nephrotoxins and other renal medications (From now, onward)    Start     Dose/Rate Route Frequency Ordered Stop    22 0600  vancomycin (VANCOCIN) 1,750 mg in sodium chloride 0.9 % 500 mL intermittent infusion         1,750 mg  over 2 Hours Intravenous ONCE 22 0534      22 0530  piperacillin-tazobactam (ZOSYN) 3.375 g vial to attach to  mL bag         3.375 g  over 30 Minutes Intravenous ONCE 22 0522            Contrast Orders - past 72 hours (72h ago, onward)    Start     Dose/Rate Route Frequency Stop    22 0400  iopamidol (ISOVUE-370) solution 75 mL         75 mL Intravenous ONCE 22 0412                  Plan:  1. Start vancomycin  1750 mg IV once in the ED.  2. Please re-consult pharmacy if treatment is to continue inpatient    Maria Guadalupe Faust [facility]     102

## 2022-12-24 NOTE — ED PROVIDER NOTE - OBJECTIVE STATEMENT
pt c/o hives to body, lip and hand swelling R>L for 2 days  unknown exposure to new product  no sob, cough, cp, throat swelling etc no abdominal pain/no diarrhea

## 2023-06-06 NOTE — ED ADULT NURSE NOTE - ED STAT RN HANDOFF TIME
Patient is a 70y old  Female who presents with a chief complaint of sepsis (2023 10:59)       HPI: female with history of insulin-dependent diabetes, CHF, hypertension who presented to the ED with nausea and vomiting for several days.  She has experienced no significant abdominal pain and history of multiple dark emesis and 1 episode of diarrhea today.  She denies fever or chills.  Denies URI symptoms.  She denies significant change in her urinary patters. CT performed on admission demonstrated mild left hydronephrosis without ureteral dilatation or obstructing calculus. Found to have DUNCAN and hypotension. Renal consulted for further eval. Has not seen a Nephrologist outpatient. Currently asymptomatic.      Having nausea and vomiting    PAST MEDICAL & SURGICAL HISTORY:  Hypertension      Diabetes      Lymphedema      H/O left bundle branch block      History of left bundle branch block (LBBB)      Systolic heart failure, chronic      H/O cataract        History of surgical removal of meniscus of knee  left in       Frozen shoulder        H/O Achilles tendon repair  lengthened bilaterally,       Fractured skull             FAMILY HISTORY:  Family history of CVA  mom, age 57    [place]    Social History:Non smoker    ICU Vital Signs Last 24 Hrs  T(C): 36.5 (2023 04:15), Max: 36.9 (2023 00:10)  T(F): 97.7 (2023 04:15), Max: 98.4 (2023 00:10)  HR: 66 (2023 08:00) (55 - 66)  BP: 100/60 (2023 08:00) (87/43 - 145/62)  BP(mean): 76 (2023 08:00) (62 - 92)  ABP: --  ABP(mean): --  RR: 13 (2023 08:00) (13 - 24)  SpO2: 92% (2023 08:00) (92% - 100%)    O2 Parameters below as of 2023 07:00  Patient On (Oxygen Delivery Method): room air        Allergies    penicillins (Hives)    Intolerances         REVIEW OF SYSTEMS:    Constitutional: Denies fatigue  HEENT: Denies headaches and dizziness  Respiratory: denies SOB, cough, or wheezing  Cardiovascular: denies CP, palpitations  Gastrointestinal: + N/V  Genitourinary: denies painful urination, increased frequency, urgency, or bloody urine  Skin: denies rashes or itching  Musculoskeletal: denies muscle aches, joint swelling  Neurologic: Denies generalized weakness, denies loss of sensation, numbness, or tingling      ICU Vital Signs Last 24 Hrs  T(C): 36.5 (2023 04:15), Max: 36.9 (2023 00:10)  T(F): 97.7 (2023 04:15), Max: 98.4 (2023 00:10)  HR: 66 (2023 08:00) (55 - 66)  BP: 100/60 (2023 08:00) (87/43 - 145/62)  BP(mean): 76 (2023 08:00) (62 - 92)  ABP: --  ABP(mean): --  RR: 13 (2023 08:00) (13 - 24)  SpO2: 92% (2023 08:00) (92% - 100%)    O2 Parameters below as of 2023 07:00  Patient On (Oxygen Delivery Method): room air      PHYSICAL EXAM:    GENERAL: NAD  HEAD:  Atraumatic, Normocephalic  EYES: EOMI, conjunctiva and sclera clear  ENMT: No Drainage from nares, No drainage from ears  NECK: Supple, neck veins full  NERVOUS SYSTEM:  Awake and Alert  CHEST/LUNG: Clear to percussion bilaterally; No rales, rhonchi, wheezing, or rubs  HEART: Regular rate and rhythm; No murmurs, rubs, or gallops  ABDOMEN: Soft, Nontender, Nondistended; Bowel sounds present, obese  EXTREMITIES:  + Edema  SKIN: No rashes No obvious ecchymosis      LABS:                                              11.4   17.18 )-----------( 170      ( 2023 06:05 )             34.2     06-06    130<L>  |  95<L>  |  126<H>  ----------------------------<  171<H>  5.0   |  25  |  5.40<H>    Ca    8.5      2023 06:05  Phos  5.3     06-06  Mg     2.4     06-06    TPro  6.7  /  Alb  2.2<L>  /  TBili  0.4  /  DBili  x   /  AST  15  /  ALT  15  /  AlkPhos  105  06-06      Urinalysis Basic - ( 2023 12:00 )    Color: Dark Yellow / Appearance: Turbid / S.016 / pH: x  Gluc: x / Ketone: Trace mg/dL  / Bili: Negative / Urobili: 1.0 mg/dL   Blood: x / Protein: 100 mg/dL / Nitrite: Negative   Leuk Esterase: Large / RBC: 6 /HPF / WBC Too Numerous to count /HPF   Sq Epi: x / Non Sq Epi: x / Bacteria: Moderate /HPF             10:45